# Patient Record
Sex: FEMALE | Race: WHITE | NOT HISPANIC OR LATINO | Employment: FULL TIME | URBAN - METROPOLITAN AREA
[De-identification: names, ages, dates, MRNs, and addresses within clinical notes are randomized per-mention and may not be internally consistent; named-entity substitution may affect disease eponyms.]

---

## 2017-01-26 ENCOUNTER — ALLSCRIPTS OFFICE VISIT (OUTPATIENT)
Dept: OTHER | Facility: OTHER | Age: 32
End: 2017-01-26

## 2017-01-26 ENCOUNTER — LAB REQUISITION (OUTPATIENT)
Dept: LAB | Facility: HOSPITAL | Age: 32
End: 2017-01-26
Payer: COMMERCIAL

## 2017-01-26 DIAGNOSIS — Z01.411 ENCOUNTER FOR GYNECOLOGICAL EXAMINATION WITH ABNORMAL FINDING: ICD-10-CM

## 2017-01-26 DIAGNOSIS — N89.8 OTHER SPECIFIED NONINFLAMMATORY DISORDERS OF VAGINA: ICD-10-CM

## 2017-01-26 DIAGNOSIS — M79.673 PAIN OF FOOT: ICD-10-CM

## 2017-01-26 DIAGNOSIS — Z20.2 CONTACT WITH AND (SUSPECTED) EXPOSURE TO INFECTIONS WITH A PREDOMINANTLY SEXUAL MODE OF TRANSMISSION: ICD-10-CM

## 2017-01-26 DIAGNOSIS — M25.511 PAIN IN RIGHT SHOULDER: ICD-10-CM

## 2017-01-26 PROCEDURE — 87389 HIV-1 AG W/HIV-1&-2 AB AG IA: CPT | Performed by: FAMILY MEDICINE

## 2017-01-26 PROCEDURE — 87491 CHLMYD TRACH DNA AMP PROBE: CPT | Performed by: FAMILY MEDICINE

## 2017-01-26 PROCEDURE — 87660 TRICHOMONAS VAGIN DIR PROBE: CPT | Performed by: FAMILY MEDICINE

## 2017-01-26 PROCEDURE — 87480 CANDIDA DNA DIR PROBE: CPT | Performed by: FAMILY MEDICINE

## 2017-01-26 PROCEDURE — 80074 ACUTE HEPATITIS PANEL: CPT | Performed by: FAMILY MEDICINE

## 2017-01-26 PROCEDURE — 86592 SYPHILIS TEST NON-TREP QUAL: CPT | Performed by: FAMILY MEDICINE

## 2017-01-26 PROCEDURE — 87591 N.GONORRHOEAE DNA AMP PROB: CPT | Performed by: FAMILY MEDICINE

## 2017-01-26 PROCEDURE — 88175 CYTOPATH C/V AUTO FLUID REDO: CPT | Performed by: FAMILY MEDICINE

## 2017-01-26 PROCEDURE — 87510 GARDNER VAG DNA DIR PROBE: CPT | Performed by: FAMILY MEDICINE

## 2017-01-27 LAB
CHLAMYDIA DNA CVX QL NAA+PROBE: NORMAL
HAV IGM SER QL: NORMAL
HBV CORE IGM SER QL: NORMAL
HBV SURFACE AG SER QL: NORMAL
HCV AB SER QL: NORMAL
HIV 1+2 AB+HIV1 P24 AG SERPL QL IA: NORMAL
N GONORRHOEA DNA GENITAL QL NAA+PROBE: NORMAL
RPR SER QL: NORMAL

## 2017-01-28 LAB
CANDIDA RRNA VAG QL PROBE: NEGATIVE
G VAGINALIS RRNA GENITAL QL PROBE: POSITIVE
T VAGINALIS RRNA GENITAL QL PROBE: NEGATIVE

## 2017-02-03 LAB
LAB AP GYN PRIMARY INTERPRETATION: NORMAL
LAB AP LMP: NORMAL
Lab: NORMAL
PATH INTERP SPEC-IMP: NORMAL

## 2017-02-18 ENCOUNTER — GENERIC CONVERSION - ENCOUNTER (OUTPATIENT)
Dept: OTHER | Facility: OTHER | Age: 32
End: 2017-02-18

## 2017-02-24 ENCOUNTER — ALLSCRIPTS OFFICE VISIT (OUTPATIENT)
Dept: OTHER | Facility: OTHER | Age: 32
End: 2017-02-24

## 2017-02-24 LAB — HCG, QUALITATIVE (HISTORICAL): POSITIVE

## 2017-03-02 ENCOUNTER — ALLSCRIPTS OFFICE VISIT (OUTPATIENT)
Dept: OTHER | Facility: OTHER | Age: 32
End: 2017-03-02

## 2017-03-02 ENCOUNTER — LAB REQUISITION (OUTPATIENT)
Dept: LAB | Facility: HOSPITAL | Age: 32
End: 2017-03-02
Payer: COMMERCIAL

## 2017-03-02 DIAGNOSIS — Z32.01 ENCOUNTER FOR PREGNANCY TEST, RESULT POSITIVE: ICD-10-CM

## 2017-03-02 LAB
B-HCG SERPL-ACNC: ABNORMAL MIU/ML
PROGEST SERPL-MCNC: 17 NG/ML

## 2017-03-02 PROCEDURE — 86850 RBC ANTIBODY SCREEN: CPT | Performed by: OBSTETRICS & GYNECOLOGY

## 2017-03-02 PROCEDURE — 84702 CHORIONIC GONADOTROPIN TEST: CPT | Performed by: OBSTETRICS & GYNECOLOGY

## 2017-03-02 PROCEDURE — 84144 ASSAY OF PROGESTERONE: CPT | Performed by: OBSTETRICS & GYNECOLOGY

## 2017-03-02 PROCEDURE — 86901 BLOOD TYPING SEROLOGIC RH(D): CPT | Performed by: OBSTETRICS & GYNECOLOGY

## 2017-03-02 PROCEDURE — 86900 BLOOD TYPING SEROLOGIC ABO: CPT | Performed by: OBSTETRICS & GYNECOLOGY

## 2017-03-03 ENCOUNTER — GENERIC CONVERSION - ENCOUNTER (OUTPATIENT)
Dept: OTHER | Facility: OTHER | Age: 32
End: 2017-03-03

## 2017-03-03 DIAGNOSIS — M25.511 PAIN IN RIGHT SHOULDER: ICD-10-CM

## 2017-03-03 DIAGNOSIS — Z36.9 ENCOUNTER FOR ANTENATAL SCREENING OF MOTHER: ICD-10-CM

## 2017-03-03 LAB
ABO GROUP BLD: NORMAL
BLD GP AB SCN SERPL QL: NEGATIVE
RH BLD: POSITIVE

## 2017-03-08 ENCOUNTER — TRANSCRIBE ORDERS (OUTPATIENT)
Dept: ADMINISTRATIVE | Facility: HOSPITAL | Age: 32
End: 2017-03-08

## 2017-03-08 ENCOUNTER — HOSPITAL ENCOUNTER (OUTPATIENT)
Dept: RADIOLOGY | Facility: MEDICAL CENTER | Age: 32
Discharge: HOME | End: 2017-03-08
Payer: COMMERCIAL

## 2017-03-08 DIAGNOSIS — M79.673 PAIN OF FOOT: ICD-10-CM

## 2017-03-08 DIAGNOSIS — M25.511 PAIN IN RIGHT SHOULDER: ICD-10-CM

## 2017-03-08 PROCEDURE — 73630 X-RAY EXAM OF FOOT: CPT

## 2017-03-08 PROCEDURE — 73030 X-RAY EXAM OF SHOULDER: CPT

## 2017-03-09 ENCOUNTER — HOSPITAL ENCOUNTER (OUTPATIENT)
Dept: ULTRASOUND IMAGING | Facility: MEDICAL CENTER | Age: 32
Discharge: HOME | End: 2017-03-09
Payer: COMMERCIAL

## 2017-03-09 DIAGNOSIS — Z36.9 ENCOUNTER FOR ANTENATAL SCREENING OF MOTHER: ICD-10-CM

## 2017-03-09 PROCEDURE — 76801 OB US < 14 WKS SINGLE FETUS: CPT

## 2017-03-13 ENCOUNTER — GENERIC CONVERSION - ENCOUNTER (OUTPATIENT)
Dept: OTHER | Facility: OTHER | Age: 32
End: 2017-03-13

## 2017-03-16 ENCOUNTER — ALLSCRIPTS OFFICE VISIT (OUTPATIENT)
Dept: OTHER | Facility: OTHER | Age: 32
End: 2017-03-16

## 2017-03-16 ENCOUNTER — LAB REQUISITION (OUTPATIENT)
Dept: LAB | Facility: HOSPITAL | Age: 32
End: 2017-03-16
Payer: COMMERCIAL

## 2017-03-16 DIAGNOSIS — Z34.81 ENCOUNTER FOR SUPERVISION OF OTHER NORMAL PREGNANCY, FIRST TRIMESTER: ICD-10-CM

## 2017-03-16 PROCEDURE — 87086 URINE CULTURE/COLONY COUNT: CPT | Performed by: NURSE PRACTITIONER

## 2017-03-17 LAB — BACTERIA UR CULT: NORMAL

## 2017-03-24 ENCOUNTER — GENERIC CONVERSION - ENCOUNTER (OUTPATIENT)
Dept: OTHER | Facility: OTHER | Age: 32
End: 2017-03-24

## 2017-03-27 ENCOUNTER — ALLSCRIPTS OFFICE VISIT (OUTPATIENT)
Dept: OTHER | Facility: OTHER | Age: 32
End: 2017-03-27

## 2017-03-28 ENCOUNTER — LAB REQUISITION (OUTPATIENT)
Dept: LAB | Facility: HOSPITAL | Age: 32
End: 2017-03-28
Payer: COMMERCIAL

## 2017-03-28 DIAGNOSIS — Z34.81 ENCOUNTER FOR SUPERVISION OF OTHER NORMAL PREGNANCY, FIRST TRIMESTER: ICD-10-CM

## 2017-03-28 LAB
ABO GROUP BLD: NORMAL
BASOPHILS # BLD AUTO: 0.01 THOUSANDS/ΜL (ref 0–0.1)
BASOPHILS NFR BLD AUTO: 0 % (ref 0–1)
BILIRUB UR QL STRIP: NEGATIVE
BLD GP AB SCN SERPL QL: NEGATIVE
CLARITY UR: NORMAL
COLOR UR: YELLOW
EOSINOPHIL # BLD AUTO: 0.1 THOUSAND/ΜL (ref 0–0.61)
EOSINOPHIL NFR BLD AUTO: 1 % (ref 0–6)
ERYTHROCYTE [DISTWIDTH] IN BLOOD BY AUTOMATED COUNT: 15.1 % (ref 11.6–15.1)
GLUCOSE UR STRIP-MCNC: NEGATIVE MG/DL
HBV SURFACE AG SER QL: NORMAL
HCT VFR BLD AUTO: 39.7 % (ref 34.8–46.1)
HGB BLD-MCNC: 12.5 G/DL (ref 11.5–15.4)
HGB UR QL STRIP.AUTO: NEGATIVE
KETONES UR STRIP-MCNC: NEGATIVE MG/DL
LEUKOCYTE ESTERASE UR QL STRIP: NEGATIVE
LYMPHOCYTES # BLD AUTO: 1.62 THOUSANDS/ΜL (ref 0.6–4.47)
LYMPHOCYTES NFR BLD AUTO: 22 % (ref 14–44)
MCH RBC QN AUTO: 30.7 PG (ref 26.8–34.3)
MCHC RBC AUTO-ENTMCNC: 31.5 G/DL (ref 31.4–37.4)
MCV RBC AUTO: 98 FL (ref 82–98)
MONOCYTES # BLD AUTO: 0.93 THOUSAND/ΜL (ref 0.17–1.22)
MONOCYTES NFR BLD AUTO: 12 % (ref 4–12)
NEUTROPHILS # BLD AUTO: 4.83 THOUSANDS/ΜL (ref 1.85–7.62)
NEUTS SEG NFR BLD AUTO: 65 % (ref 43–75)
NITRITE UR QL STRIP: NEGATIVE
NRBC BLD AUTO-RTO: 0 /100 WBCS
PH UR STRIP.AUTO: 7 [PH] (ref 4.5–8)
PLATELET # BLD AUTO: 282 THOUSANDS/UL (ref 149–390)
PMV BLD AUTO: 10.8 FL (ref 8.9–12.7)
PROT UR STRIP-MCNC: NEGATIVE MG/DL
RBC # BLD AUTO: 4.07 MILLION/UL (ref 3.81–5.12)
RH BLD: POSITIVE
RUBV IGG SERPL IA-ACNC: 86.2 IU/ML
SP GR UR STRIP.AUTO: 1.01 (ref 1–1.03)
UROBILINOGEN UR QL STRIP.AUTO: 0.2 E.U./DL
WBC # BLD AUTO: 7.5 THOUSAND/UL (ref 4.31–10.16)

## 2017-03-28 PROCEDURE — 87086 URINE CULTURE/COLONY COUNT: CPT | Performed by: NURSE PRACTITIONER

## 2017-03-28 PROCEDURE — 80081 OBSTETRIC PANEL INC HIV TSTG: CPT | Performed by: NURSE PRACTITIONER

## 2017-03-28 PROCEDURE — 81003 URINALYSIS AUTO W/O SCOPE: CPT | Performed by: NURSE PRACTITIONER

## 2017-03-29 LAB
BACTERIA UR CULT: NORMAL
HIV 1+2 AB+HIV1 P24 AG SERPL QL IA: NORMAL
RPR SER QL: NORMAL

## 2017-04-06 ENCOUNTER — GENERIC CONVERSION - ENCOUNTER (OUTPATIENT)
Dept: OTHER | Facility: OTHER | Age: 32
End: 2017-04-06

## 2017-04-06 ENCOUNTER — ALLSCRIPTS OFFICE VISIT (OUTPATIENT)
Dept: PERINATAL CARE | Facility: CLINIC | Age: 32
End: 2017-04-06
Payer: COMMERCIAL

## 2017-04-06 PROCEDURE — 76813 OB US NUCHAL MEAS 1 GEST: CPT | Performed by: OBSTETRICS & GYNECOLOGY

## 2017-04-06 PROCEDURE — 76801 OB US < 14 WKS SINGLE FETUS: CPT | Performed by: OBSTETRICS & GYNECOLOGY

## 2017-04-13 ENCOUNTER — ALLSCRIPTS OFFICE VISIT (OUTPATIENT)
Dept: OTHER | Facility: OTHER | Age: 32
End: 2017-04-13

## 2017-04-21 ENCOUNTER — GENERIC CONVERSION - ENCOUNTER (OUTPATIENT)
Dept: OTHER | Facility: OTHER | Age: 32
End: 2017-04-21

## 2017-04-27 ENCOUNTER — GENERIC CONVERSION - ENCOUNTER (OUTPATIENT)
Dept: OTHER | Facility: OTHER | Age: 32
End: 2017-04-27

## 2017-04-27 ENCOUNTER — ALLSCRIPTS OFFICE VISIT (OUTPATIENT)
Dept: PERINATAL CARE | Facility: CLINIC | Age: 32
End: 2017-04-27
Payer: COMMERCIAL

## 2017-04-27 PROCEDURE — 76817 TRANSVAGINAL US OBSTETRIC: CPT | Performed by: OBSTETRICS & GYNECOLOGY

## 2017-04-27 PROCEDURE — 76815 OB US LIMITED FETUS(S): CPT | Performed by: OBSTETRICS & GYNECOLOGY

## 2017-05-11 ENCOUNTER — ALLSCRIPTS OFFICE VISIT (OUTPATIENT)
Dept: PERINATAL CARE | Facility: CLINIC | Age: 32
End: 2017-05-11
Payer: COMMERCIAL

## 2017-05-11 ENCOUNTER — GENERIC CONVERSION - ENCOUNTER (OUTPATIENT)
Dept: OTHER | Facility: OTHER | Age: 32
End: 2017-05-11

## 2017-05-11 PROCEDURE — 76817 TRANSVAGINAL US OBSTETRIC: CPT | Performed by: OBSTETRICS & GYNECOLOGY

## 2017-05-12 ENCOUNTER — GENERIC CONVERSION - ENCOUNTER (OUTPATIENT)
Dept: OTHER | Facility: OTHER | Age: 32
End: 2017-05-12

## 2017-05-25 ENCOUNTER — ALLSCRIPTS OFFICE VISIT (OUTPATIENT)
Dept: PERINATAL CARE | Facility: CLINIC | Age: 32
End: 2017-05-25
Payer: COMMERCIAL

## 2017-05-25 ENCOUNTER — GENERIC CONVERSION - ENCOUNTER (OUTPATIENT)
Dept: OTHER | Facility: OTHER | Age: 32
End: 2017-05-25

## 2017-05-25 PROCEDURE — 76811 OB US DETAILED SNGL FETUS: CPT | Performed by: OBSTETRICS & GYNECOLOGY

## 2017-05-25 PROCEDURE — 76817 TRANSVAGINAL US OBSTETRIC: CPT | Performed by: OBSTETRICS & GYNECOLOGY

## 2017-06-08 ENCOUNTER — ALLSCRIPTS OFFICE VISIT (OUTPATIENT)
Dept: PERINATAL CARE | Facility: CLINIC | Age: 32
End: 2017-06-08
Payer: COMMERCIAL

## 2017-06-08 ENCOUNTER — GENERIC CONVERSION - ENCOUNTER (OUTPATIENT)
Dept: OTHER | Facility: OTHER | Age: 32
End: 2017-06-08

## 2017-06-08 PROCEDURE — 76816 OB US FOLLOW-UP PER FETUS: CPT | Performed by: OBSTETRICS & GYNECOLOGY

## 2017-06-08 PROCEDURE — 76817 TRANSVAGINAL US OBSTETRIC: CPT | Performed by: OBSTETRICS & GYNECOLOGY

## 2017-06-12 ENCOUNTER — GENERIC CONVERSION - ENCOUNTER (OUTPATIENT)
Dept: OTHER | Facility: OTHER | Age: 32
End: 2017-06-12

## 2017-06-16 ENCOUNTER — ALLSCRIPTS OFFICE VISIT (OUTPATIENT)
Dept: OTHER | Facility: OTHER | Age: 32
End: 2017-06-16

## 2017-06-22 ENCOUNTER — ALLSCRIPTS OFFICE VISIT (OUTPATIENT)
Dept: PERINATAL CARE | Facility: CLINIC | Age: 32
End: 2017-06-22
Payer: COMMERCIAL

## 2017-06-22 ENCOUNTER — GENERIC CONVERSION - ENCOUNTER (OUTPATIENT)
Dept: OTHER | Facility: OTHER | Age: 32
End: 2017-06-22

## 2017-06-22 PROCEDURE — 59025 FETAL NON-STRESS TEST: CPT | Performed by: OBSTETRICS & GYNECOLOGY

## 2017-06-22 PROCEDURE — 76817 TRANSVAGINAL US OBSTETRIC: CPT | Performed by: OBSTETRICS & GYNECOLOGY

## 2017-07-12 ENCOUNTER — ALLSCRIPTS OFFICE VISIT (OUTPATIENT)
Dept: OTHER | Facility: OTHER | Age: 32
End: 2017-07-12

## 2017-07-20 ENCOUNTER — ALLSCRIPTS OFFICE VISIT (OUTPATIENT)
Dept: PERINATAL CARE | Facility: CLINIC | Age: 32
End: 2017-07-20
Payer: COMMERCIAL

## 2017-07-20 ENCOUNTER — GENERIC CONVERSION - ENCOUNTER (OUTPATIENT)
Dept: OTHER | Facility: OTHER | Age: 32
End: 2017-07-20

## 2017-07-20 PROCEDURE — 76817 TRANSVAGINAL US OBSTETRIC: CPT | Performed by: OBSTETRICS & GYNECOLOGY

## 2017-07-20 PROCEDURE — 76816 OB US FOLLOW-UP PER FETUS: CPT | Performed by: OBSTETRICS & GYNECOLOGY

## 2017-08-10 ENCOUNTER — GENERIC CONVERSION - ENCOUNTER (OUTPATIENT)
Dept: OTHER | Facility: OTHER | Age: 32
End: 2017-08-10

## 2017-08-10 DIAGNOSIS — R73.02 IMPAIRED GLUCOSE TOLERANCE: ICD-10-CM

## 2017-08-10 DIAGNOSIS — R20.2 PARESTHESIA OF SKIN: ICD-10-CM

## 2017-08-10 DIAGNOSIS — R20.0 ANESTHESIA OF SKIN: ICD-10-CM

## 2017-08-11 ENCOUNTER — HOSPITAL ENCOUNTER (OUTPATIENT)
Facility: HOSPITAL | Age: 32
Setting detail: OBSERVATION
Discharge: HOME | End: 2017-08-12
Attending: EMERGENCY MEDICINE | Admitting: OBSTETRICS & GYNECOLOGY
Payer: COMMERCIAL

## 2017-08-11 ENCOUNTER — HOSPITAL ENCOUNTER (OUTPATIENT)
Dept: NON INVASIVE DIAGNOSTICS | Facility: CLINIC | Age: 32
Discharge: HOME | End: 2017-08-11
Payer: COMMERCIAL

## 2017-08-11 DIAGNOSIS — I82.409 ACUTE DEEP VEIN THROMBOSIS (DVT) (HCC): Primary | ICD-10-CM

## 2017-08-11 DIAGNOSIS — R20.0 ANESTHESIA OF SKIN: ICD-10-CM

## 2017-08-11 DIAGNOSIS — R20.2 PARESTHESIA OF SKIN: ICD-10-CM

## 2017-08-11 LAB
ATRIAL RATE: 81 BPM
P AXIS: 37 DEGREES
PR INTERVAL: 120 MS
QRS AXIS: 75 DEGREES
QRSD INTERVAL: 92 MS
QT INTERVAL: 354 MS
QTC INTERVAL: 411 MS
T WAVE AXIS: -5 DEGREES
VENTRICULAR RATE: 81 BPM

## 2017-08-11 PROCEDURE — 76817 TRANSVAGINAL US OBSTETRIC: CPT | Performed by: OBSTETRICS & GYNECOLOGY

## 2017-08-11 PROCEDURE — 93971 EXTREMITY STUDY: CPT

## 2017-08-11 PROCEDURE — 96372 THER/PROPH/DIAG INJ SC/IM: CPT

## 2017-08-11 PROCEDURE — 93005 ELECTROCARDIOGRAM TRACING: CPT

## 2017-08-11 PROCEDURE — 99284 EMERGENCY DEPT VISIT MOD MDM: CPT

## 2017-08-11 RX ORDER — ALBUTEROL SULFATE 90 UG/1
AEROSOL, METERED RESPIRATORY (INHALATION)
COMMUNITY
Start: 2017-04-13

## 2017-08-11 RX ORDER — PRENATAL NO.42/FOLIC ACID 1.4 MG
TABLET CHEW,IMMED AND DELAY REL,BIPHASE ORAL
COMMUNITY

## 2017-08-11 RX ORDER — FAMOTIDINE 20 MG/1
TABLET, FILM COATED ORAL
COMMUNITY
Start: 2017-06-12

## 2017-08-11 RX ORDER — CALCIUM CARBONATE 200(500)MG
1000 TABLET,CHEWABLE ORAL 2 TIMES DAILY PRN
Status: DISCONTINUED | OUTPATIENT
Start: 2017-08-11 | End: 2017-08-12 | Stop reason: HOSPADM

## 2017-08-11 RX ORDER — ACETAMINOPHEN 325 MG/1
650 TABLET ORAL EVERY 4 HOURS PRN
Status: DISCONTINUED | OUTPATIENT
Start: 2017-08-11 | End: 2017-08-12 | Stop reason: HOSPADM

## 2017-08-11 RX ADMIN — ENOXAPARIN SODIUM 120 MG: 120 INJECTION SUBCUTANEOUS at 18:59

## 2017-08-11 RX ADMIN — Medication 1000 MG: at 20:07

## 2017-08-12 VITALS
BODY MASS INDEX: 32.66 KG/M2 | DIASTOLIC BLOOD PRESSURE: 57 MMHG | HEIGHT: 61 IN | RESPIRATION RATE: 18 BRPM | HEART RATE: 83 BPM | OXYGEN SATURATION: 97 % | WEIGHT: 173 LBS | TEMPERATURE: 97.4 F | SYSTOLIC BLOOD PRESSURE: 104 MMHG

## 2017-08-12 PROBLEM — I82.409 ACUTE DEEP VEIN THROMBOSIS (DVT) (HCC): Status: ACTIVE | Noted: 2017-08-12

## 2017-08-12 PROBLEM — Z34.90 PREGNANT: Status: ACTIVE | Noted: 2017-08-12

## 2017-08-12 PROBLEM — Z87.51 HISTORY OF PRETERM DELIVERY: Status: ACTIVE | Noted: 2017-08-12

## 2017-08-12 PROBLEM — Z3A.31 31 WEEKS GESTATION OF PREGNANCY: Status: ACTIVE | Noted: 2017-08-12

## 2017-08-12 PROBLEM — R07.9 CHEST PAIN: Status: ACTIVE | Noted: 2017-08-12

## 2017-08-12 PROCEDURE — G0463 HOSPITAL OUTPT CLINIC VISIT: HCPCS

## 2017-08-12 PROCEDURE — 99204 OFFICE O/P NEW MOD 45 MIN: CPT

## 2017-08-12 RX ADMIN — ENOXAPARIN SODIUM 80 MG: 80 INJECTION SUBCUTANEOUS at 09:14

## 2017-08-17 ENCOUNTER — LAB REQUISITION (OUTPATIENT)
Dept: LAB | Facility: HOSPITAL | Age: 32
End: 2017-08-17
Payer: COMMERCIAL

## 2017-08-17 ENCOUNTER — GENERIC CONVERSION - ENCOUNTER (OUTPATIENT)
Dept: OTHER | Facility: OTHER | Age: 32
End: 2017-08-17

## 2017-08-17 DIAGNOSIS — Z34.93 ENCOUNTER FOR SUPERVISION OF NORMAL PREGNANCY IN THIRD TRIMESTER: ICD-10-CM

## 2017-08-17 LAB
BASOPHILS # BLD AUTO: 0.01 THOUSANDS/ΜL (ref 0–0.1)
BASOPHILS NFR BLD AUTO: 0 % (ref 0–1)
EOSINOPHIL # BLD AUTO: 0.07 THOUSAND/ΜL (ref 0–0.61)
EOSINOPHIL NFR BLD AUTO: 1 % (ref 0–6)
ERYTHROCYTE [DISTWIDTH] IN BLOOD BY AUTOMATED COUNT: 13.7 % (ref 11.6–15.1)
GLUCOSE 1H P 50 G GLC PO SERPL-MCNC: 157 MG/DL
HCT VFR BLD AUTO: 32.6 % (ref 34.8–46.1)
HGB BLD-MCNC: 10.9 G/DL (ref 11.5–15.4)
LYMPHOCYTES # BLD AUTO: 1.38 THOUSANDS/ΜL (ref 0.6–4.47)
LYMPHOCYTES NFR BLD AUTO: 19 % (ref 14–44)
MCH RBC QN AUTO: 29.2 PG (ref 26.8–34.3)
MCHC RBC AUTO-ENTMCNC: 33.4 G/DL (ref 31.4–37.4)
MCV RBC AUTO: 87 FL (ref 82–98)
MONOCYTES # BLD AUTO: 0.82 THOUSAND/ΜL (ref 0.17–1.22)
MONOCYTES NFR BLD AUTO: 11 % (ref 4–12)
NEUTROPHILS # BLD AUTO: 4.86 THOUSANDS/ΜL (ref 1.85–7.62)
NEUTS SEG NFR BLD AUTO: 69 % (ref 43–75)
NRBC BLD AUTO-RTO: 0 /100 WBCS
PLATELET # BLD AUTO: 321 THOUSANDS/UL (ref 149–390)
PMV BLD AUTO: 10.4 FL (ref 8.9–12.7)
RBC # BLD AUTO: 3.73 MILLION/UL (ref 3.81–5.12)
WBC # BLD AUTO: 7.17 THOUSAND/UL (ref 4.31–10.16)

## 2017-08-17 PROCEDURE — 82950 GLUCOSE TEST: CPT | Performed by: OBSTETRICS & GYNECOLOGY

## 2017-08-17 PROCEDURE — 85025 COMPLETE CBC W/AUTO DIFF WBC: CPT | Performed by: OBSTETRICS & GYNECOLOGY

## 2017-08-18 ENCOUNTER — GENERIC CONVERSION - ENCOUNTER (OUTPATIENT)
Dept: OTHER | Facility: OTHER | Age: 32
End: 2017-08-18

## 2017-08-23 ENCOUNTER — APPOINTMENT (OUTPATIENT)
Dept: LAB | Facility: HOSPITAL | Age: 32
End: 2017-08-23
Attending: OBSTETRICS & GYNECOLOGY
Payer: COMMERCIAL

## 2017-08-23 DIAGNOSIS — R73.02 IMPAIRED GLUCOSE TOLERANCE: ICD-10-CM

## 2017-08-23 LAB — GLUCOSE P FAST SERPL-MCNC: 104 MG/DL (ref 65–99)

## 2017-08-23 PROCEDURE — 36415 COLL VENOUS BLD VENIPUNCTURE: CPT

## 2017-08-23 PROCEDURE — 82951 GLUCOSE TOLERANCE TEST (GTT): CPT

## 2017-08-28 ENCOUNTER — GENERIC CONVERSION - ENCOUNTER (OUTPATIENT)
Dept: OTHER | Facility: OTHER | Age: 32
End: 2017-08-28

## 2017-08-31 ENCOUNTER — GENERIC CONVERSION - ENCOUNTER (OUTPATIENT)
Dept: OTHER | Facility: OTHER | Age: 32
End: 2017-08-31

## 2017-09-01 ENCOUNTER — ALLSCRIPTS OFFICE VISIT (OUTPATIENT)
Dept: PERINATAL CARE | Facility: CLINIC | Age: 32
End: 2017-09-01
Payer: COMMERCIAL

## 2017-09-01 ENCOUNTER — GENERIC CONVERSION - ENCOUNTER (OUTPATIENT)
Dept: OTHER | Facility: OTHER | Age: 32
End: 2017-09-01

## 2017-09-01 PROCEDURE — 76816 OB US FOLLOW-UP PER FETUS: CPT | Performed by: OBSTETRICS & GYNECOLOGY

## 2017-09-05 ENCOUNTER — GENERIC CONVERSION - ENCOUNTER (OUTPATIENT)
Dept: OTHER | Facility: OTHER | Age: 32
End: 2017-09-05

## 2017-09-05 ENCOUNTER — APPOINTMENT (OUTPATIENT)
Dept: PERINATAL CARE | Facility: CLINIC | Age: 32
End: 2017-09-05
Payer: COMMERCIAL

## 2017-09-05 PROCEDURE — G0109 DIAB MANAGE TRN IND/GROUP: HCPCS | Performed by: OBSTETRICS & GYNECOLOGY

## 2017-09-11 ENCOUNTER — APPOINTMENT (OUTPATIENT)
Dept: PERINATAL CARE | Facility: CLINIC | Age: 32
End: 2017-09-11
Payer: COMMERCIAL

## 2017-09-11 PROCEDURE — G0108 DIAB MANAGE TRN  PER INDIV: HCPCS | Performed by: OBSTETRICS & GYNECOLOGY

## 2017-09-13 ENCOUNTER — LAB REQUISITION (OUTPATIENT)
Dept: LAB | Facility: HOSPITAL | Age: 32
End: 2017-09-13
Payer: COMMERCIAL

## 2017-09-13 ENCOUNTER — GENERIC CONVERSION - ENCOUNTER (OUTPATIENT)
Dept: OTHER | Facility: OTHER | Age: 32
End: 2017-09-13

## 2017-09-13 DIAGNOSIS — Z34.93 ENCOUNTER FOR SUPERVISION OF NORMAL PREGNANCY IN THIRD TRIMESTER: ICD-10-CM

## 2017-09-13 PROCEDURE — 87653 STREP B DNA AMP PROBE: CPT | Performed by: NURSE PRACTITIONER

## 2017-09-14 LAB — GP B STREP DNA SPEC QL NAA+PROBE: ABNORMAL

## 2017-09-20 ENCOUNTER — GENERIC CONVERSION - ENCOUNTER (OUTPATIENT)
Dept: OTHER | Facility: OTHER | Age: 32
End: 2017-09-20

## 2017-09-26 ENCOUNTER — ALLSCRIPTS OFFICE VISIT (OUTPATIENT)
Dept: OTHER | Facility: OTHER | Age: 32
End: 2017-09-26

## 2017-09-26 ENCOUNTER — GENERIC CONVERSION - ENCOUNTER (OUTPATIENT)
Dept: OTHER | Facility: OTHER | Age: 32
End: 2017-09-26

## 2017-09-29 ENCOUNTER — GENERIC CONVERSION - ENCOUNTER (OUTPATIENT)
Dept: OTHER | Facility: OTHER | Age: 32
End: 2017-09-29

## 2017-09-29 ENCOUNTER — ALLSCRIPTS OFFICE VISIT (OUTPATIENT)
Dept: PERINATAL CARE | Facility: CLINIC | Age: 32
End: 2017-09-29
Payer: COMMERCIAL

## 2017-09-29 PROCEDURE — 76816 OB US FOLLOW-UP PER FETUS: CPT | Performed by: OBSTETRICS & GYNECOLOGY

## 2017-10-05 ENCOUNTER — HOSPITAL ENCOUNTER (INPATIENT)
Facility: HOSPITAL | Age: 32
LOS: 3 days | Discharge: HOME | End: 2017-10-08
Attending: OBSTETRICS & GYNECOLOGY | Admitting: OBSTETRICS & GYNECOLOGY
Payer: COMMERCIAL

## 2017-10-05 ENCOUNTER — HOSPITAL ENCOUNTER (OUTPATIENT)
Dept: LABOR AND DELIVERY | Facility: HOSPITAL | Age: 32
Discharge: HOME | End: 2017-10-05
Payer: COMMERCIAL

## 2017-10-05 DIAGNOSIS — Z3A.39 39 WEEKS GESTATION OF PREGNANCY: ICD-10-CM

## 2017-10-05 PROBLEM — O24.419 GESTATIONAL DIABETES MELLITUS (GDM) AFFECTING PREGNANCY, ANTEPARTUM: Status: ACTIVE | Noted: 2017-10-05

## 2017-10-05 PROBLEM — O22.30 DVT (DEEP VEIN THROMBOSIS) IN PREGNANCY: Status: ACTIVE | Noted: 2017-10-05

## 2017-10-05 PROBLEM — K59.09 CHRONIC CONSTIPATION: Status: ACTIVE | Noted: 2017-10-05

## 2017-10-05 PROBLEM — F43.10 PTSD (POST-TRAUMATIC STRESS DISORDER): Status: ACTIVE | Noted: 2017-10-05

## 2017-10-05 LAB
ABO GROUP BLD: NORMAL
BLD GP AB SCN SERPL QL: NEGATIVE
ERYTHROCYTE [DISTWIDTH] IN BLOOD BY AUTOMATED COUNT: 14.3 % (ref 11.6–15.1)
GLUCOSE SERPL-MCNC: 86 MG/DL (ref 65–140)
HCT VFR BLD AUTO: 33.6 % (ref 34.8–46.1)
HGB BLD-MCNC: 11.1 G/DL (ref 11.5–15.4)
MCH RBC QN AUTO: 28 PG (ref 26.8–34.3)
MCHC RBC AUTO-ENTMCNC: 33 G/DL (ref 31.4–37.4)
MCV RBC AUTO: 85 FL (ref 82–98)
PLATELET # BLD AUTO: 400 THOUSANDS/UL (ref 149–390)
PMV BLD AUTO: 10.9 FL (ref 8.9–12.7)
RBC # BLD AUTO: 3.97 MILLION/UL (ref 3.81–5.12)
RH BLD: POSITIVE
SPECIMEN EXPIRATION DATE: NORMAL
WBC # BLD AUTO: 10.78 THOUSAND/UL (ref 4.31–10.16)

## 2017-10-05 PROCEDURE — 85027 COMPLETE CBC AUTOMATED: CPT | Performed by: OBSTETRICS & GYNECOLOGY

## 2017-10-05 PROCEDURE — 86901 BLOOD TYPING SEROLOGIC RH(D): CPT | Performed by: OBSTETRICS & GYNECOLOGY

## 2017-10-05 PROCEDURE — 86900 BLOOD TYPING SEROLOGIC ABO: CPT | Performed by: OBSTETRICS & GYNECOLOGY

## 2017-10-05 PROCEDURE — 86592 SYPHILIS TEST NON-TREP QUAL: CPT | Performed by: OBSTETRICS & GYNECOLOGY

## 2017-10-05 PROCEDURE — 86850 RBC ANTIBODY SCREEN: CPT | Performed by: OBSTETRICS & GYNECOLOGY

## 2017-10-05 PROCEDURE — 82948 REAGENT STRIP/BLOOD GLUCOSE: CPT

## 2017-10-05 RX ORDER — BUTORPHANOL TARTRATE 1 MG/ML
1 INJECTION, SOLUTION INTRAMUSCULAR; INTRAVENOUS ONCE AS NEEDED
Status: DISCONTINUED | OUTPATIENT
Start: 2017-10-05 | End: 2017-10-06

## 2017-10-05 RX ORDER — ALBUTEROL SULFATE 90 UG/1
2 AEROSOL, METERED RESPIRATORY (INHALATION) EVERY 4 HOURS PRN
Status: DISCONTINUED | OUTPATIENT
Start: 2017-10-05 | End: 2017-10-06

## 2017-10-05 RX ORDER — OXYTOCIN/RINGER'S LACTATE 30/500 ML
1-30 PLASTIC BAG, INJECTION (ML) INTRAVENOUS
Status: DISCONTINUED | OUTPATIENT
Start: 2017-10-05 | End: 2017-10-06

## 2017-10-05 RX ORDER — ONDANSETRON 2 MG/ML
4 INJECTION INTRAMUSCULAR; INTRAVENOUS EVERY 4 HOURS PRN
Status: DISCONTINUED | OUTPATIENT
Start: 2017-10-05 | End: 2017-10-06

## 2017-10-05 RX ORDER — SODIUM CHLORIDE, SODIUM LACTATE, POTASSIUM CHLORIDE, CALCIUM CHLORIDE 600; 310; 30; 20 MG/100ML; MG/100ML; MG/100ML; MG/100ML
125 INJECTION, SOLUTION INTRAVENOUS CONTINUOUS
Status: DISCONTINUED | OUTPATIENT
Start: 2017-10-05 | End: 2017-10-06

## 2017-10-05 RX ADMIN — ONDANSETRON 4 MG: 2 INJECTION INTRAMUSCULAR; INTRAVENOUS at 23:41

## 2017-10-05 RX ADMIN — SODIUM CHLORIDE, SODIUM LACTATE, POTASSIUM CHLORIDE, AND CALCIUM CHLORIDE 125 ML/HR: .6; .31; .03; .02 INJECTION, SOLUTION INTRAVENOUS at 22:00

## 2017-10-05 RX ADMIN — Medication 2 MILLI-UNITS/MIN: at 22:04

## 2017-10-05 RX ADMIN — SODIUM CHLORIDE 5 MILLION UNITS: 0.9 INJECTION, SOLUTION INTRAVENOUS at 22:23

## 2017-10-06 ENCOUNTER — ANESTHESIA EVENT (INPATIENT)
Dept: LABOR AND DELIVERY | Facility: HOSPITAL | Age: 32
End: 2017-10-06
Payer: COMMERCIAL

## 2017-10-06 ENCOUNTER — ANESTHESIA (INPATIENT)
Dept: LABOR AND DELIVERY | Facility: HOSPITAL | Age: 32
End: 2017-10-06
Payer: COMMERCIAL

## 2017-10-06 LAB
BASE EXCESS BLDCOV CALC-SCNC: -1.8 MMOL/L (ref 1–9)
GLUCOSE SERPL-MCNC: 74 MG/DL (ref 65–140)
GLUCOSE SERPL-MCNC: 83 MG/DL (ref 65–140)
GLUCOSE SERPL-MCNC: 84 MG/DL (ref 65–140)
HCO3 BLDCOV-SCNC: 21.4 MMOL/L (ref 12.2–28.6)
OXYHGB MFR BLDCOV: 82.8 %
PCO2 BLDCOV: 32.8 MM HG (ref 27–43)
PH BLDCOV: 7.43 [PH] (ref 7.19–7.49)
PO2 BLDCOV: 35.7 MM HG (ref 15–45)
RPR SER QL: NORMAL
SAO2 % BLDCOV: 19.7 ML/DL

## 2017-10-06 PROCEDURE — 3E0P3VZ INTRODUCTION OF HORMONE INTO FEMALE REPRODUCTIVE, PERCUTANEOUS APPROACH: ICD-10-PCS | Performed by: OBSTETRICS & GYNECOLOGY

## 2017-10-06 PROCEDURE — 82805 BLOOD GASES W/O2 SATURATION: CPT | Performed by: OBSTETRICS & GYNECOLOGY

## 2017-10-06 PROCEDURE — 82948 REAGENT STRIP/BLOOD GLUCOSE: CPT

## 2017-10-06 RX ORDER — OXYTOCIN/RINGER'S LACTATE 30/500 ML
250 PLASTIC BAG, INJECTION (ML) INTRAVENOUS CONTINUOUS
Status: ACTIVE | OUTPATIENT
Start: 2017-10-06 | End: 2017-10-06

## 2017-10-06 RX ORDER — ACETAMINOPHEN 325 MG/1
650 TABLET ORAL EVERY 4 HOURS PRN
Status: DISCONTINUED | OUTPATIENT
Start: 2017-10-06 | End: 2017-10-08 | Stop reason: HOSPADM

## 2017-10-06 RX ORDER — IBUPROFEN 600 MG/1
600 TABLET ORAL EVERY 6 HOURS PRN
Status: DISCONTINUED | OUTPATIENT
Start: 2017-10-06 | End: 2017-10-08 | Stop reason: HOSPADM

## 2017-10-06 RX ORDER — CALCIUM CARBONATE 200(500)MG
500 TABLET,CHEWABLE ORAL DAILY PRN
Status: DISCONTINUED | OUTPATIENT
Start: 2017-10-06 | End: 2017-10-08 | Stop reason: HOSPADM

## 2017-10-06 RX ORDER — DIPHENHYDRAMINE HCL 25 MG
25 TABLET ORAL EVERY 6 HOURS PRN
Status: DISCONTINUED | OUTPATIENT
Start: 2017-10-06 | End: 2017-10-08 | Stop reason: HOSPADM

## 2017-10-06 RX ORDER — DIAPER,BRIEF,INFANT-TODD,DISP
1 EACH MISCELLANEOUS AS NEEDED
Status: DISCONTINUED | OUTPATIENT
Start: 2017-10-06 | End: 2017-10-08 | Stop reason: HOSPADM

## 2017-10-06 RX ORDER — SIMETHICONE 80 MG
80 TABLET,CHEWABLE ORAL 4 TIMES DAILY PRN
Status: DISCONTINUED | OUTPATIENT
Start: 2017-10-06 | End: 2017-10-08 | Stop reason: HOSPADM

## 2017-10-06 RX ORDER — OXYCODONE HYDROCHLORIDE AND ACETAMINOPHEN 5; 325 MG/1; MG/1
2 TABLET ORAL EVERY 4 HOURS PRN
Status: DISCONTINUED | OUTPATIENT
Start: 2017-10-06 | End: 2017-10-08 | Stop reason: HOSPADM

## 2017-10-06 RX ORDER — ONDANSETRON 2 MG/ML
4 INJECTION INTRAMUSCULAR; INTRAVENOUS EVERY 8 HOURS PRN
Status: DISCONTINUED | OUTPATIENT
Start: 2017-10-06 | End: 2017-10-08 | Stop reason: HOSPADM

## 2017-10-06 RX ORDER — DIPHENHYDRAMINE HYDROCHLORIDE 50 MG/ML
25 INJECTION INTRAMUSCULAR; INTRAVENOUS EVERY 6 HOURS PRN
Status: DISCONTINUED | OUTPATIENT
Start: 2017-10-06 | End: 2017-10-06

## 2017-10-06 RX ORDER — ONDANSETRON 2 MG/ML
4 INJECTION INTRAMUSCULAR; INTRAVENOUS EVERY 4 HOURS PRN
Status: DISCONTINUED | OUTPATIENT
Start: 2017-10-06 | End: 2017-10-06

## 2017-10-06 RX ORDER — OXYCODONE HYDROCHLORIDE AND ACETAMINOPHEN 5; 325 MG/1; MG/1
1 TABLET ORAL EVERY 4 HOURS PRN
Status: DISCONTINUED | OUTPATIENT
Start: 2017-10-06 | End: 2017-10-08 | Stop reason: HOSPADM

## 2017-10-06 RX ORDER — DOCUSATE SODIUM 100 MG/1
100 CAPSULE, LIQUID FILLED ORAL 2 TIMES DAILY
Status: DISCONTINUED | OUTPATIENT
Start: 2017-10-06 | End: 2017-10-08 | Stop reason: HOSPADM

## 2017-10-06 RX ADMIN — OXYCODONE HYDROCHLORIDE AND ACETAMINOPHEN 1 TABLET: 5; 325 TABLET ORAL at 14:22

## 2017-10-06 RX ADMIN — IBUPROFEN 600 MG: 600 TABLET, FILM COATED ORAL at 11:07

## 2017-10-06 RX ADMIN — SODIUM CHLORIDE 2.5 MILLION UNITS: 9 INJECTION, SOLUTION INTRAVENOUS at 02:34

## 2017-10-06 RX ADMIN — ENOXAPARIN SODIUM 80 MG: 80 INJECTION SUBCUTANEOUS at 22:26

## 2017-10-06 RX ADMIN — IBUPROFEN 600 MG: 600 TABLET, FILM COATED ORAL at 17:35

## 2017-10-06 RX ADMIN — OXYCODONE HYDROCHLORIDE AND ACETAMINOPHEN 1 TABLET: 5; 325 TABLET ORAL at 22:32

## 2017-10-06 RX ADMIN — Medication 500 MG: at 16:30

## 2017-10-06 NOTE — OB LABOR/OXYTOCIN SAFETY PROGRESS
Oxytocin Safety Progress Check Note - Olena Butler 28 y o  female MRN: [id number]    Unit/Bed#: L&D 322-01 Encounter: [id number]    Obstetric History       T0      L3     SAB1   TAB0   Ectopic0   Multiple0   Live Births3      Gestational Age: 39w1d  Dose (erica-units/min) Oxytocin: 20 erica-units/min  Contraction Frequency (minutes): 2-3  Contraction Quality: Moderate  Tachysystole: No   Dilation: 6-7        Effacement (%): 90  Station: -1  Baseline Rate: 135 bpm     FHR Category: Category II          Notes/comments:   Pt complains of urge to push  Advised pt that she is not fully dilated yet  Peanut ball placed and pt turned to side   Will re-evaluate prn          Yue Rasheed MD 10/6/2017 5:56 AM

## 2017-10-06 NOTE — ANESTHESIA POSTPROCEDURE EVALUATION
Post-Op Assessment Note      CV Status:  Stable    Mental Status:  Alert and awake    Hydration Status:  Euvolemic    PONV Controlled:  Controlled    Airway Patency:  Patent    Post Op Vitals Reviewed: Yes        Post-op block assessment: no complications        /03 (10/06/17 0750)    Temp      Pulse 98 (10/06/17 0750)   Resp      SpO2

## 2017-10-06 NOTE — LACTATION NOTE
This note was copied from a baby's chart  CONSULT - LACTATION  Unique Contreras 0 days male MRN: [id number]    [facility] NURSERY Room / Bed: L&D 306(N)/L&D 306(N) Encounter: [id number]    Maternal Information     MOTHER:  Zabrina Fuller  Maternal Age: 28 y o    OB History: #: 1, Date: , Sex: None, Weight: None, GA: 18w0d, Delivery: Spontaneous , Apgar1: None, Apgar5: None, Living: None, Birth Comments: None    #: 2, Date: , Sex: Female, Weight: 1332 g (2 lb 15 oz), GA: 28w0d, Delivery: Vaginal, Spontaneous Delivery, Apgar1: None, Apgar5: None, Living: Living, Birth Comments: None    #: 3, Date: , Sex: None, Weight: None, GA: 6w0d, Delivery: Therapeutic , Apgar1: None, Apgar5: None, Living: None, Birth Comments: None    #: 4, Date: , Sex: Female, Weight: 3544 g (7 lb 13 oz), GA: 34w0d, Delivery: Vaginal, Spontaneous Delivery, Apgar1: None, Apgar5: None, Living: Living, Birth Comments: None    #: 5, Date: , Sex: Female, Weight: 3544 g (7 lb 13 oz), GA: 36w0d, Delivery: Vaginal, Spontaneous Delivery, Apgar1: None, Apgar5: None, Living: Living, Birth Comments: None    #: 6, Date: , Sex: None, Weight: None, GA: 5w0d, Delivery: Spontaneous , Apgar1: None, Apgar5: None, Living: None, Birth Comments: None    #: 7, Date: 10/06/17, Sex: Male, Weight: 3487 g (7 lb 11 oz), GA: 39w1d, Delivery: Vaginal, Spontaneous Delivery, Apgar1: 8, Apgar5: 9, Living: Living, Birth Comments: None   Previouse breast reduction surgery? No    Lactation history:   Has patient previously breast fed: No (she excl  pumped for her first child)   How long had patient previously breast fed:     Previous breast feeding complications:     No past surgical history on file       Birth information:  YOB: 2017   Time of birth: 6:11 AM   Sex: male   Delivery type: Vaginal, Spontaneous Delivery   Birth Weight: 3487 g (7 lb 11 oz)   Percent of Weight Change: 0%     Gestational Age: 36w3d   [unfilled]    Assessment     Breast and nipple assessment: did not assess at this time    [place] Assessment: did not assess at this time    Feeding assessment: feeding well  LATCH:  Latch: Grasps breast, tongue down, lips flanged, rhythmic sucking   Audible Swallowing: Spontaneous and intermittent (24 hours old)   Type of Nipple: Everted (After stimulation)   Comfort (Breast/Nipple): Soft/non-tender   Hold (Positioning): No assist from staff, mother able to position/hold infant   LATCH Score: 10          Feeding recommendations:  breast feed on demand     Breastfeeding booklets given and reviewed with patient  Patient verbalized breastfeeding is going well  Enc to call for assistance as needed,phone # given      Pascale Morrissey RN 10/6/2017 10:37 AM

## 2017-10-06 NOTE — OB LABOR/OXYTOCIN SAFETY PROGRESS
Oxytocin Safety Progress Check Note - Lindsay Petty 28 y o  female MRN: [id number]    Unit/Bed#: L&D 322-01 Encounter: [id number]    Obstetric History       T0      L3     SAB1   TAB0   Ectopic0   Multiple0   Live Births3      Gestational Age: 39w1d  Dose (erica-units/min) Oxytocin: 18 erica-units/min  Contraction Frequency (minutes): 2-3  Contraction Quality: Mild  Tachysystole: No   Dilation: 4        Effacement (%): 80  Station: -2  Baseline Rate: 130 bpm     FHR Category: Category I          Notes/comments:   Pt complains of SROM and new onset painful contractions  Desires epidural   Anesthesia notified            Antoni Daniel MD 10/6/2017 4:22 AM

## 2017-10-06 NOTE — PLAN OF CARE
Knowledge Deficit     Verbalizes understanding of labor plan Progressing        Labor & Delivery     Manages discomfort Progressing     Patient vital signs are stable Progressing

## 2017-10-06 NOTE — L&D DELIVERY NOTE
Delivery Summary - OB/GYN   Jero Zuniga 28 y o  female MRN: [id number]  Unit/Bed#: L&D 322-01 Encounter: [id number]    Pre-delivery Diagnosis:   1  39w1d pregnancy  2  DVT in pregnancy  3  GDMA1  4  H o PTD   5  GBS positive by culture      Post-delivery Diagnosis: same, delivered    Attending: Sharlene Larsen MD     Assistant(s): Aditi Brownlee MD    Procedure:     Anesthesia: epidural    Estimated Blood Loss:  250 mL    Specimens:   1  Arterial and venous cord gases  2  Cord blood  3  Segment of umbilical cord  4  Placenta to storage     Complications:  None apparent    Findings:  1  Viable male  delivered on 10/06/17 at 0611   weight pending;  Apgar scores of 8 at one minute and 9 at five minutes  2  Spontaneous delivery of placenta with centrally inserted 3-vessel cord       Disposition: Patient tolerated the procedure well and was recovering in labor and delivery room with family and  before being transferred to the post-partum floor  Procedure Details     Description of procedure    After pushing for 5 minutes, on 10/06/17 at 0611 patient delivered a viable male , weight pending, Apgars of 8 (1 min) and 9 (5 min)  The fetal vertex delivered spontaneously  There was a double nuchal cord  These were reduced prior to delivery of the shoulders  The anterior shoulder delivered atraumatically with maternal expulsive forces and the assistance of downward traction  The posterior shoulder delivered with maternal expulsive forces and the assistance of upward traction  The remainder of the fetus delivered spontaneously  Upon delivery, the infant was placed on the mothers abdomen and the cord was clamped and cut  The infant was noted to cry spontaneously and was moving all extremities appropriately  There was no evidence for injury  Awaiting nurse resuscitators evaluated the  at bedside  Arterial and venous cord blood gases and cord blood was collected for analysis  These were promptly sent to the lab  In the immediate post-partum, 30 units of IV pitocin was administered and the uterus was noted to contract down well with massage and pitocin  The placenta delivered spontaneously at 0615 and was noted to have a centrally inserted 3 vessel cord  The vagina, cervix, and perineum were inspected and there was noted to be no lacerations  At the conclusion of the delivery, all needle, sponge, and instrument counts were noted to be correct  Patient tolerated the procedure well and was allowed to recover in labor and delivery room with family and  before being transferred to the post-partum floor      William Pedroza MD

## 2017-10-06 NOTE — ANESTHESIA PREPROCEDURE EVALUATION
Review of Systems/Medical History  Patient summary reviewed        Cardiovascular  Negative cardio ROS    Pulmonary  Negative pulmonary ROS Asthma: well controlled/ stable , ,        GI/Hepatic  Negative GI/hepatic ROS          Negative  ROS        Endo/Other  Negative endo/other ROS Diabetes gestational ,      GYN  Negative gynecology ROS          Hematology  Negative hematology ROS      Musculoskeletal  Negative musculoskeletal ROS        Neurology  Negative neurology ROS      Psychology   Negative psychology ROS            Physical Exam    Airway    Mallampati score: II  TM Distance: >3 FB  Neck ROM: full     Dental       Cardiovascular  Comment: Negative ROS, Cardiovascular exam normal    Pulmonary  Pulmonary exam normal     Other Findings        Anesthesia Plan  ASA Score- 3       Anesthesia Type- epidural        Induction-       Informed Consent  Anesthetic plan and risks discussed with patient
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
## 2017-10-06 NOTE — OB LABOR/OXYTOCIN SAFETY PROGRESS
Patient was evaluated at approximately midnight on 10/06/2017  Was alerted by nursing the patient is feeling uncomfortable and requesting something for pain  Discussed with patient options for IV medications for pain control  At that time patient stated that she was just feeling more uncomfortable rather than having more abdominal pain  At this time she would prefer to not have IV medication and is requesting for a heating pad for her back discomfort  Cervical exam was done to evaluate patient due to request for pain medication and concern for becoming more uncomfortable  Cervical exam revealed cervix to be unchanged at this time  Pitocin currently at a rate of 8 milliunits per minute  Patient is silver approximately every 2-3 minutes  Contractions are lasting approximately 60-90 seconds  Fetal heart tracing continues to be reassuring with a baseline of 120 beats per minute, moderate variability, 15 x 15 accelerations, no decelerations visualized  Category 1  At this time will continue Pitocin titration for induction of labor  Plan to recheck patient when she becomes notably more uncomfortable with contractions or earlier if concern for maternal or fetal distress  Jena Padron MD  OBGYN, PGY3  10/6/2017 12:38 AM

## 2017-10-06 NOTE — PLAN OF CARE
Knowledge Deficit     Verbalizes understanding of labor plan Completed        Labor & Delivery     Manages discomfort Completed     Patient vital signs are stable Completed

## 2017-10-06 NOTE — OB LABOR/OXYTOCIN SAFETY PROGRESS
Oxytocin Safety Progress Check Note - Rivas Olivares 28 y o  female MRN: [id number]    Unit/Bed#: L&D 322-01 Encounter: [id number]    Obstetric History       T0      L3     SAB1   TAB0   Ectopic0   Multiple0   Live Births3      Gestational Age: 39w1d  Dose (erica-units/min) Oxytocin: 22 erica-units/min  Contraction Frequency (minutes): 2-3  Contraction Quality: Mild, Moderate  Tachysystole: No   Dilation: 4        Effacement (%): 100  Station: -1  Baseline Rate: 135 bpm     FHR Category: Category I          Notes/comments:      Pt complains of pubic pressure with contractions  Pain much improved with epidural    Decrease pitocin to 20 mu/hr as some contractions are 1-2 minutes, while most are 2-3  Continue IOL         Flavio Mccartney MD 10/6/2017 5:45 AM

## 2017-10-06 NOTE — OB LABOR/OXYTOCIN SAFETY PROGRESS
Pitocin titration currently at rate of 16 milliunits per minute  Uterine contractions occurring every 2-3 minutes  Patient reports some discomfort with contractions but is not requesting pain medications at this time  Cervical exam deferred at this time  [place]:  Q 2-3 minutes  FHT:  125 beats per minute, moderate variability, accels observed, decelerations absent  Category 1  Will continue induction of labor with Pitocin titration  Pitocin to be increased by 2 milliunits per minute every 20 minutes until contraction pattern established  Continue to monitor for uterine tachysystole  Jena Barton MD  OBGYN, PGY3  10/6/2017 2:50 AM

## 2017-10-06 NOTE — ANESTHESIA PROCEDURE NOTES
Epidural Block    Patient location during procedure: OB  Start time: 10/6/2017 4:31 AM  Reason for block: procedure for pain, at surgeon's request, post-op pain management and primary anesthetic  Staffing  Anesthesiologist: Erskine Osler  Performed: anesthesiologist   Preanesthetic Checklist  Completed: patient identified, site marked, surgical consent, pre-op evaluation, timeout performed, IV checked, risks and benefits discussed and monitors and equipment checked  Epidural  Patient position: sitting  Prep: ChloraPrep  Patient monitoring: cardiac monitor and frequent blood pressure checks  Approach: midline  Location: lumbar (1-5)  Injection technique: AP air  Needle  Needle type: Tuohy   Needle gauge: 18 G  Catheter type: side hole  Catheter size: 20 G  Test dose: negative and lidocaine 1 5% with epinephrine 1-to-200,000  Assessment  Sensory level: [id number] aspiration for CSF, negative aspiration for heme and no paresthesia on injection  patient tolerated the procedure well with no immediate complications

## 2017-10-07 RX ADMIN — IBUPROFEN 600 MG: 600 TABLET, FILM COATED ORAL at 15:16

## 2017-10-07 RX ADMIN — OXYCODONE HYDROCHLORIDE AND ACETAMINOPHEN 1 TABLET: 5; 325 TABLET ORAL at 21:19

## 2017-10-07 RX ADMIN — ENOXAPARIN SODIUM 80 MG: 80 INJECTION SUBCUTANEOUS at 10:25

## 2017-10-07 RX ADMIN — OXYCODONE HYDROCHLORIDE AND ACETAMINOPHEN 1 TABLET: 5; 325 TABLET ORAL at 07:01

## 2017-10-07 RX ADMIN — IBUPROFEN 600 MG: 600 TABLET, FILM COATED ORAL at 03:22

## 2017-10-07 RX ADMIN — DOCUSATE SODIUM 100 MG: 100 CAPSULE, LIQUID FILLED ORAL at 10:24

## 2017-10-07 RX ADMIN — DOCUSATE SODIUM 100 MG: 100 CAPSULE, LIQUID FILLED ORAL at 17:03

## 2017-10-07 RX ADMIN — OXYCODONE HYDROCHLORIDE AND ACETAMINOPHEN 1 TABLET: 5; 325 TABLET ORAL at 17:03

## 2017-10-07 RX ADMIN — ENOXAPARIN SODIUM 80 MG: 80 INJECTION SUBCUTANEOUS at 21:19

## 2017-10-07 RX ADMIN — Medication 500 MG: at 00:12

## 2017-10-07 NOTE — PLAN OF CARE
Problem: PAIN - ADULT  Goal: Verbalizes/displays adequate comfort level or baseline comfort level  Interventions:  - Encourage patient to monitor pain and request assistance  - Assess pain using appropriate pain scale  - Administer analgesics based on type and severity of pain and evaluate response  - Implement non-pharmacological measures as appropriate and evaluate response  - Consider cultural and social influences on pain and pain management  - Notify physician/advanced practitioner if interventions unsuccessful or patient reports new pain   Outcome: Progressing      Problem: SAFETY ADULT  Goal: Patient will remain free of falls  INTERVENTIONS:  - Assess patient frequently for physical needs  -  Identify cognitive and physical deficits and behaviors that affect risk of falls    -  [place] fall precautions as indicated by assessment   - Educate patient/family on patient safety including physical limitations  - Instruct patient to call for assistance with activity based on assessment  - Modify environment to reduce risk of injury  - Consider OT/PT consult to assist with strengthening/mobility   Outcome: Progressing    Goal: Maintain or return to baseline ADL function  INTERVENTIONS:  -  Assess patient's ability to carry out ADLs; assess patient's baseline for ADL function and identify physical deficits which impact ability to perform ADLs (bathing, care of mouth/teeth, toileting, grooming, dressing, etc )  - Assess/evaluate cause of self-care deficits   - Assess range of motion  - Assess patient's mobility; develop plan if impaired  - Assess patient's need for assistive devices and provide as appropriate  - Encourage maximum independence but intervene and supervise when necessary  ¯ Involve family in performance of ADLs  ¯ Assess for home care needs following discharge   ¯ Request OT consult to assist with ADL evaluation and planning for discharge  ¯ Provide patient education as appropriate   Outcome: Progressing

## 2017-10-07 NOTE — PROGRESS NOTES
Postpartum Progress Note - OB/GYN   Amrik Rivera 28 y o  female MRN: [id number]  Unit/Bed#: L&D 306-01 Encounter: [id number]    ASSESSMENT:  Amrik Rivera 28 y o  [id number] s/p Spontaneous Vaginal Delivery Postpartum day  1  Pt is well appearing and with no current complaints  Hx of DVT on lovenox 80mg BID  Denies leg pain/calf tenderness, CP, SOB, dyspnea, or diaphoresis  PLAN:  1) Continue routine postpartum care  2) Encourage ambulation  3) Pain control as needed  4) Advance diet as tolerated  5) Dispo: stable and comfortable    Subjective/Objective     SUBJECTIVE:  Pain: no  Tolerating Oral Intake: yes   Voiding: yes  Flatus: yes  Bowel Movement: no  Ambulating: yes  Breastfeeding: yes  Chest Pain: no  Shortness of Breath: no  Leg Pain/Discomfort: no  Lochia: minimal    OBJECTIVE:     Vitals: Blood pressure 107/69, pulse 69, temperature 97 8 °F (36 6 °C), temperature source Oral, resp  rate 16, height 5' 1" (1 549 m), weight 80 7 kg (178 lb), SpO2 97 %, currently breastfeeding       General: appears well, alert and oriented x 3, pleasant and cooperative  Cardiovascular: RRR, normal S1/S2, no GRM  Lungs:  clear to auscultation bilaterally; no WRR, non-labored breathing   Abdomen: normal bowel sounds, soft, no tenderness, no distention  : Uterine fundus firm: -2 cm below the umbilicus  Lower Extremities: Non-tender, peripheral pulses normal; no clubbing, cyanosis, or edema    Labs:   Lab Results   Component Value Date    WBC 10 78 (H) 10/05/2017    HGB 11 1 (L) 10/05/2017    HCT 33 6 (L) 10/05/2017    MCV 85 10/05/2017     (H) 10/05/2017       Medications:   Current Facility-Administered Medications   Medication Dose Route Frequency    acetaminophen (TYLENOL) tablet 650 mg  650 mg Oral Q4H PRN    benzocaine-menthol-lanolin-aloe (DERMOPLAST) 20-0 5 % topical spray 1 application  1 application Topical 4x Daily PRN    calcium carbonate (TUMS) chewable tablet 500 mg  500 mg Oral Daily PRN    diphenhydrAMINE (BENADRYL) tablet 25 mg  25 mg Oral Q6H PRN    docusate sodium (COLACE) capsule 100 mg  100 mg Oral BID    enoxaparin (LOVENOX) subcutaneous injection 80 mg  80 mg Subcutaneous Q12H [address]    hydrocortisone 1 % cream 1 application  1 application Topical PRN    ibuprofen (MOTRIN) tablet 600 mg  600 mg Oral Q6H PRN    ondansetron (ZOFRAN) injection 4 mg  4 mg Intravenous Q8H PRN    oxyCODONE-acetaminophen (PERCOCET) 5-325 mg per tablet 1 tablet  1 tablet Oral Q4H PRN    oxyCODONE-acetaminophen (PERCOCET) 5-325 mg per tablet 2 tablet  2 tablet Oral Q4H PRN    ROPivacaine 0 2% PCEA   Epidural Continuous    simethicone (MYLICON) chewable tablet 80 mg  80 mg Oral 4x Daily PRN    witch hazel-glycerin (TUCKS) topical pad 1 pad  1 pad Topical PRN     Invasive Devices          No matching active lines, drains, or airways               Marcy Street MD PGY-2   10/7/2017 10:50 AM

## 2017-10-08 VITALS
BODY MASS INDEX: 33.61 KG/M2 | DIASTOLIC BLOOD PRESSURE: 67 MMHG | OXYGEN SATURATION: 98 % | HEIGHT: 61 IN | SYSTOLIC BLOOD PRESSURE: 98 MMHG | HEART RATE: 72 BPM | WEIGHT: 178 LBS | RESPIRATION RATE: 19 BRPM | TEMPERATURE: 97.8 F

## 2017-10-08 RX ORDER — IBUPROFEN 600 MG/1
600 TABLET ORAL EVERY 6 HOURS PRN
Qty: 30 TABLET | Refills: 0
Start: 2017-10-08

## 2017-10-08 RX ORDER — ACETAMINOPHEN 325 MG/1
TABLET ORAL
Qty: 30 TABLET | Refills: 0
Start: 2017-10-08

## 2017-10-08 RX ORDER — DIAPER,BRIEF,INFANT-TODD,DISP
1 EACH MISCELLANEOUS AS NEEDED
Qty: 30 G | Refills: 0
Start: 2017-10-08

## 2017-10-08 RX ORDER — DOCUSATE SODIUM 100 MG/1
100 CAPSULE, LIQUID FILLED ORAL 2 TIMES DAILY
Qty: 10 CAPSULE | Refills: 0
Start: 2017-10-08

## 2017-10-08 RX ADMIN — OXYCODONE HYDROCHLORIDE AND ACETAMINOPHEN 1 TABLET: 5; 325 TABLET ORAL at 05:49

## 2017-10-08 RX ADMIN — DOCUSATE SODIUM 100 MG: 100 CAPSULE, LIQUID FILLED ORAL at 08:49

## 2017-10-08 RX ADMIN — OXYCODONE HYDROCHLORIDE AND ACETAMINOPHEN 1 TABLET: 5; 325 TABLET ORAL at 11:08

## 2017-10-08 RX ADMIN — ENOXAPARIN SODIUM 80 MG: 80 INJECTION SUBCUTANEOUS at 08:49

## 2017-10-08 NOTE — PLAN OF CARE
Problem: PAIN - ADULT  Goal: Verbalizes/displays adequate comfort level or baseline comfort level  Interventions:  - Encourage patient to monitor pain and request assistance  - Assess pain using appropriate pain scale  - Administer analgesics based on type and severity of pain and evaluate response  - Implement non-pharmacological measures as appropriate and evaluate response  - Consider cultural and social influences on pain and pain management  - Notify physician/advanced practitioner if interventions unsuccessful or patient reports new pain   Outcome: Progressing      Problem: SAFETY ADULT  Goal: Patient will remain free of falls  INTERVENTIONS:  - Assess patient frequently for physical needs  -  Identify cognitive and physical deficits and behaviors that affect risk of falls    -  [place] fall precautions as indicated by assessment   - Educate patient/family on patient safety including physical limitations  - Instruct patient to call for assistance with activity based on assessment  - Modify environment to reduce risk of injury  - Consider OT/PT consult to assist with strengthening/mobility   Outcome: Progressing    Goal: Maintain or return to baseline ADL function  INTERVENTIONS:  -  Assess patient's ability to carry out ADLs; assess patient's baseline for ADL function and identify physical deficits which impact ability to perform ADLs (bathing, care of mouth/teeth, toileting, grooming, dressing, etc )  - Assess/evaluate cause of self-care deficits   - Assess range of motion  - Assess patient's mobility; develop plan if impaired  - Assess patient's need for assistive devices and provide as appropriate  - Encourage maximum independence but intervene and supervise when necessary  ¯ Involve family in performance of ADLs  ¯ Assess for home care needs following discharge   ¯ Request OT consult to assist with ADL evaluation and planning for discharge  ¯ Provide patient education as appropriate   Outcome: Progressing    Goal: Maintain or return mobility status to optimal level  INTERVENTIONS:  - Assess patient's baseline mobility status (ambulation, transfers, stairs, etc )    - Identify cognitive and physical deficits and behaviors that affect mobility  - Identify mobility aids required to assist with transfers and/or ambulation (gait belt, sit-to-stand, lift, walker, cane, etc )  - [place] fall precautions as indicated by assessment  - Record patient progress and toleration of activity level on Mobility SBAR; progress patient to next Phase/Stage  - Instruct patient to call for assistance with activity based on assessment  - Request Rehabilitation consult to assist with strengthening/weightbearing, etc    Outcome: Progressing      Problem: DISCHARGE PLANNING  Goal: Discharge to home or other facility with appropriate resources  INTERVENTIONS:  - Identify barriers to discharge w/patient and caregiver  - Arrange for needed discharge resources and transportation as appropriate  - Identify discharge learning needs (meds, wound care, etc )  - Arrange for interpretive services to assist at discharge as needed  - Refer to Case Management Department for coordinating discharge planning if the patient needs post-hospital services based on physician/advanced practitioner order or complex needs related to functional status, cognitive ability, or social support system   Outcome: Progressing

## 2017-10-08 NOTE — DISCHARGE SUMMARY
Discharge Summary -   Karl Hernandez 28 y o  female MRN: [id number]  Unit/Bed#: L&D 502-30 Encounter: [id number]    Admission Date: 10/5/2017     Discharge Date: 10/8/2017    Attending: Dr Wolf Plata Diagnosis:   Term pregnancy  Induced labor  Single fetus  A1 Gestational DM  GBS positive  Hx LLE DVT during pregnancy    Secondary Diagnosis:   Same as above, delivered    Procedures: Spontaneous vaginal delivery    Hospital Course:   Karl Hernandez is a 28 y o  [id number] at 36w3d who was initially admitted for induction of labor  She delivered a viable male  on 10/6/17 at 60 124 37 75  Weight 7lbs 11oz via normal spontaneous vaginal delivery  She sustained no laceration during delivery  Apgars were 8 (1 min) and 9 (5 min)   was transferred to  nursery  Patient tolerated the procedure well  Her post-delivery course was uncomplicated  Her postpartum pain was well controlled with oral analgesics  On day of discharge, she was ambulating and able to reasonably perform all ADLs  She was voiding and had appropriate bowel function  Pain was well controlled  She was discharged home on postpartum day #2 without complications  Patient was instructed to follow up with her OB as an outpatient and was given appropriate warnings to call provider if she develops signs of infection or uncontrolled pain  Complications: None    Condition at discharge: stable     Discharge Medications: For a complete list of the patient's medications, please refer to her medical reconcillation report  Discharge instructions/Information to patient and family:   See after visit summary for information provided to patient and family  Provisions for Follow-Up Care:  See after visit summary for information related to follow-up care and any pertinent home health orders  Disposition: See After Visit Summary for discharge disposition information      Planned Readmission: Sienna Link   10/8/2017  6:41 AM

## 2017-10-08 NOTE — PROGRESS NOTES
Progress Note - OB/GYN   Meghan Bodily 28 y o  female MRN: [id number]  Unit/Bed#: L&D 306-01 Encounter: [id number]    Assessment:  Term pregnancy, s/p , PPD#2  Hx DVT    Plan:  Continue routine postpartum care  Hx DVT: August  Cont therapeutic Lovenox of 80mg BID for 6w postpartum  Discharge to home    Subjective/Objective   Chief Complaint: I'm doing find    Subjective: Pt reports feeling well today  Ambulating  Tolerating PO  Voiding without difficulty  No BM yet but passing flatus  Lochia improving  Denies HA, CP, SOB, extremity pain  Objective:   Vitals: Blood pressure 98/67, pulse 72, temperature 97 8 °F (36 6 °C), temperature source Oral, resp  rate 19, height 5' 1" (1 549 m), weight 80 7 kg (178 lb), SpO2 98 %, currently breastfeeding  ,Body mass index is 33 63 kg/m²  No intake or output data in the 24 hours ending 10/08/17 0811    Invasive Devices          No matching active lines, drains, or airways          Physical Exam:   Gen: AaOx3, NAD, pleasant  Abd: Soft, nontender, nondistended  : Fundus firm, nontender, located at -1cm below umbilicus  Extremities: No edema, nontender, Negative Cecilia's bilaterally      Lab, Imaging and other studies: I have personally reviewed pertinent reports              Maurice Bernard DO  OB/GYN, PGY2  10/8/2017, 8:13 AM

## 2017-10-08 NOTE — PLAN OF CARE
Problem: PAIN - ADULT  Goal: Verbalizes/displays adequate comfort level or baseline comfort level  Interventions:  - Encourage patient to monitor pain and request assistance  - Assess pain using appropriate pain scale  - Administer analgesics based on type and severity of pain and evaluate response  - Implement non-pharmacological measures as appropriate and evaluate response  - Consider cultural and social influences on pain and pain management  - Notify physician/advanced practitioner if interventions unsuccessful or patient reports new pain   Outcome: Completed Date Met: 10/08/17      Problem: SAFETY ADULT  Goal: Patient will remain free of falls  INTERVENTIONS:  - Assess patient frequently for physical needs  -  Identify cognitive and physical deficits and behaviors that affect risk of falls    -  [place] fall precautions as indicated by assessment   - Educate patient/family on patient safety including physical limitations  - Instruct patient to call for assistance with activity based on assessment  - Modify environment to reduce risk of injury  - Consider OT/PT consult to assist with strengthening/mobility   Outcome: Completed Date Met: 10/08/17    Goal: Maintain or return to baseline ADL function  INTERVENTIONS:  -  Assess patient's ability to carry out ADLs; assess patient's baseline for ADL function and identify physical deficits which impact ability to perform ADLs (bathing, care of mouth/teeth, toileting, grooming, dressing, etc )  - Assess/evaluate cause of self-care deficits   - Assess range of motion  - Assess patient's mobility; develop plan if impaired  - Assess patient's need for assistive devices and provide as appropriate  - Encourage maximum independence but intervene and supervise when necessary  ¯ Involve family in performance of ADLs  ¯ Assess for home care needs following discharge   ¯ Request OT consult to assist with ADL evaluation and planning for discharge  ¯ Provide patient education as appropriate   Outcome: Completed Date Met: 10/08/17    Goal: Maintain or return mobility status to optimal level  INTERVENTIONS:  - Assess patient's baseline mobility status (ambulation, transfers, stairs, etc )    - Identify cognitive and physical deficits and behaviors that affect mobility  - Identify mobility aids required to assist with transfers and/or ambulation (gait belt, sit-to-stand, lift, walker, cane, etc )  - [place] fall precautions as indicated by assessment  - Record patient progress and toleration of activity level on Mobility SBAR; progress patient to next Phase/Stage  - Instruct patient to call for assistance with activity based on assessment  - Request Rehabilitation consult to assist with strengthening/weightbearing, etc    Outcome: Completed Date Met: 10/08/17      Problem: DISCHARGE PLANNING  Goal: Discharge to home or other facility with appropriate resources  INTERVENTIONS:  - Identify barriers to discharge w/patient and caregiver  - Arrange for needed discharge resources and transportation as appropriate  - Identify discharge learning needs (meds, wound care, etc )  - Arrange for interpretive services to assist at discharge as needed  - Refer to Case Management Department for coordinating discharge planning if the patient needs post-hospital services based on physician/advanced practitioner order or complex needs related to functional status, cognitive ability, or social support system   Outcome: Completed Date Met: 10/08/17

## 2017-10-10 DIAGNOSIS — R07.81 PLEURODYNIA: ICD-10-CM

## 2017-10-11 ENCOUNTER — TRANSCRIBE ORDERS (OUTPATIENT)
Dept: ADMINISTRATIVE | Facility: HOSPITAL | Age: 32
End: 2017-10-11

## 2017-10-11 ENCOUNTER — APPOINTMENT (OUTPATIENT)
Dept: RADIOLOGY | Facility: MEDICAL CENTER | Age: 32
End: 2017-10-11
Payer: COMMERCIAL

## 2017-10-11 DIAGNOSIS — R07.81 PLEURODYNIA: ICD-10-CM

## 2017-10-11 PROCEDURE — 71101 X-RAY EXAM UNILAT RIBS/CHEST: CPT

## 2017-10-12 ENCOUNTER — ALLSCRIPTS OFFICE VISIT (OUTPATIENT)
Dept: OTHER | Facility: OTHER | Age: 32
End: 2017-10-12

## 2017-10-16 LAB — PLACENTA IN STORAGE: NORMAL

## 2017-11-10 ENCOUNTER — ALLSCRIPTS OFFICE VISIT (OUTPATIENT)
Dept: OTHER | Facility: OTHER | Age: 32
End: 2017-11-10

## 2017-11-13 DIAGNOSIS — M79.10 MYALGIA: ICD-10-CM

## 2017-11-13 DIAGNOSIS — O22.30 DEEP PHLEBOTHROMBOSIS DURING PREGNANCY: ICD-10-CM

## 2017-11-13 DIAGNOSIS — M79.645 PAIN OF FINGER OF LEFT HAND: ICD-10-CM

## 2017-11-13 DIAGNOSIS — M25.571 PAIN IN RIGHT ANKLE: ICD-10-CM

## 2017-11-14 ENCOUNTER — HOSPITAL ENCOUNTER (OUTPATIENT)
Dept: NON INVASIVE DIAGNOSTICS | Facility: HOSPITAL | Age: 32
Discharge: HOME | End: 2017-11-14
Payer: COMMERCIAL

## 2017-11-14 DIAGNOSIS — O22.30 DEEP PHLEBOTHROMBOSIS DURING PREGNANCY: ICD-10-CM

## 2017-11-14 PROCEDURE — 93971 EXTREMITY STUDY: CPT
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
## 2017-12-11 ENCOUNTER — ALLSCRIPTS OFFICE VISIT (OUTPATIENT)
Dept: OTHER | Facility: OTHER | Age: 32
End: 2017-12-11

## 2017-12-12 NOTE — PROGRESS NOTES
Assessment    1  Fibromyalgia syndrome (729 1) (M79 7)   2  Pain of left thumb (729 5) (M79 645)   3  Rhomboid muscle pain (729 1) (M79 1)   4  Right ankle pain (719 47) (M25 571)    Plan  Pain of left thumb, Rhomboid muscle pain, Right ankle pain    · Physical Therapy Referral Other Co-Management  *  Status: Active  Requested for:02Mdn4257  are Referring to a non- Preferred Provider : Patient refused suggestion for    recommended provider  Care Summary provided  : Yes  PMH: Bilateral hip pain, Chronic lumbar pain, Depression    · Cyclobenzaprine HCl - 10 MG Oral Tablet; TAKE 1 TABLET Bedtime PRN musclespasms  Rib pain on right side    · Oxycodone-Acetaminophen 5-325 MG Oral Tablet; TAKE 1 TABLET Every 8 hoursPRN    Discussion/Summary    Pt is a 27 yo FMultiple arthralgias/chronic pain syndrome -reviewed patient's symptoms with her today  It appears that her symptoms are secondary to her chronic musculoskeletal complaints however have been exacerbated secondary to the increased work she has been doing taking care of her  son  She has had extensive workups in the past for these problems  At this time, she was advised that she would benefit greatly from physical therapy  Referral was given today  She may continue with her Percocet  She was given also a prescription for her Flexeril  Follow-up in 2-3 months  Chief Complaint  Patient presents for a 1 month f/u  She stated that she would also like to discuss multiple areas of pain that has been worsening  History of Present Illness  Patient is a 59-year-old female presents today for a follow-up on her persistent cream  She states that she has been having pain in multiple locations of her body  Most of her pain is mainly located over her right shoulder blade, right ankle, left wrist as well as her low back  She states that her pain has been exacerbated secondary to the care of her  son  has been under significant stress secondary to multiple social problems  She states that her fiance has left her secondary to the falls protruding tests from her  son  Patient states that 1 year ago, she was having a panic attack  She was drinking during that time however to treat her panic symptoms, she took alprazolam  After this episode, she states that she went out with her female friend  She currently does not recall any other events  She states that she woke up in a hotel room  Shortly after this, she found out that she was pregnant  Review of Systems   Constitutional: not feeling poorly-- and-- not feeling tired  Eyes: no eyesight problems-- and-- no purulent discharge from the eyes  ENT: no nosebleeds,-- no sore throat-- and-- no nasal discharge  Cardiovascular: no chest pain-- and-- no palpitations  Respiratory: no cough-- and-- no shortness of breath during exertion  Gastrointestinal: no nausea-- and-- no diarrhea  Genitourinary: no pelvic pain-- and-- no dysmenorrhea  Musculoskeletal: arthralgias-- and-- myalgias, but-- no joint swelling  Integumentary: no rashes-- and-- no skin lesions  Neurological: no headache,-- no numbness-- and-- no dizziness  Psychiatric: anxiety-- and-- depression  Endocrine: no muscle weakness-- and-- no deepening of the voice  Hematologic/Lymphatic: no tendency for easy bleeding-- and-- no tendency for easy bruising  Active Problems    1  Chronic constipation (564 00) (K59 09)   2  Chronic lumbar pain (724 2,338 29) (M54 5,G89 29)   3  Chronic osteoarthritis (715 90) (M19 90)   4  Depression (311) (F32 9)   5  DVT (deep vein thrombosis) in pregnancy (671 30) (O22 30,I82 409)   6  Eczema (692 9) (L30 9)   7  Extremity numbness (782 0) (R20 0)   8  Fibromyalgia syndrome (729 1) (M79 7)   9  Gestational diabetes mellitus, currently pregnant (648 83) (O24 419)   10  H/O  delivery, currently pregnant (V23 41) (O09 219)   11  Heart burn (787 1) (R12)   12  Hyperreflexia (796 1) (R29 2)   13   Low grade squamous intraepithelial lesion (LGSIL) on cervical Pap smear (795 03)  (R87 612)   14  Mild asthma (493 90) (J45 998)   15  Pain syndrome, chronic (338 4) (G89 4)   16  Panic anxiety syndrome (300 01) (F41 0)   17  PTSD (post-traumatic stress disorder) (309 81) (F43 10)   18  Reactive airway disease (493 90) (J45 909)   19  Rib pain on right side (786 50) (R07 81)   20  Seizure disorder (345 90) (G40 909)    Past Medical History  1  History of Bacterial vaginosis (616 10,041 9) (N76 0,B96 89)   2  History of Encounter for long-term use of opiate analgesic (V58 69) (Z79 891)   3  History of Encounter for pregnancy related examination in third trimester (V22 1) (Z34 93)   4  History of allergic rhinitis (V12 69) (Z87 09)   5  History of anxiety disorder (V11 8) (Z86 59)   6  History of bleeding disorder (V12 3) (Z86 2)   7  History of depression (V11 8) (Z86 59)   8  History of influenza vaccination (V49 89) (Z92 29)   9  History of influenza vaccination (V49 89) (Z92 29)   10  History of pregnancy (V13 29)   11  Denied: History of substance abuse    The active problems and past medical history were reviewed and updated today  Surgical History  1  History of Treatment Of Lower Leg Fracture    The surgical history was reviewed and updated today  Family History  Mother    1  Denied: Family history of substance abuse  Father    2  Family history of Bipolar disorder   3  Denied: Family history of substance abuse  Paternal Aunt    4  Family history of Seizures  Family History    5  Family history of Back disorder   6  Family history of diabetes mellitus (V18 0) (Z83 3)   7  Family history of heart disease (V17 49) (Z82 49)   8  Family history of hypertension (V17 49) (Z82 49)   9  Family history of malignant neoplasm (V16 9) (Z80 9)   10  Family history of neuropathy (V17 2) (Z82 0)   11  Family history of thyroid disease (V18 19) (Z83 49)    The family history was reviewed and updated today         Social History     · Current every day smoker (305 1) (F17 200)   · Former smoker (V15 82) (L52 070)   · No alcohol use  The social history was reviewed and updated today  The social history was reviewed and is unchanged  Current Meds   1  Oxycodone-Acetaminophen 5-325 MG Oral Tablet; TAKE 1 TABLET Every 8 hours PRN; Therapy: 91ZHC8404 to ([phone]); Last Rx:10Oct2017 Ordered   2  Shahnaz Ivy; Therapy: (Evie Flick) to Recorded   3  Ventolin  (90 Base) MCG/ACT Inhalation Aerosol Solution; INHALE 2 PUFFS BY MOUTH EVERY 4 TO 6 HOURS  SPACE 60 SECONDS APART; Therapy: 41Nwz9431 to (Last Rx:12Oct2017)  Requested for: 12Oct2017 Ordered    The medication list was reviewed and updated today  Allergies  1  LaMICtal TABS   2  Vicodin TABS  3  Adhesive Tape   4  FRUIT   5  Other   6  Seasonal   7  VEGETABLES    Vitals  Vital Signs    Recorded: 11Dec2017 03:51PM   Temperature 98 F, Tympanic   Heart Rate 79   Pulse Quality Normal   Systolic 828, LUE, Sitting   Diastolic 80, LUE, Sitting   Height 5 ft 2 in   Weight 152 lb 7 oz   BMI Calculated 27 88   BSA Calculated 1 7   O2 Saturation 98, RA       Physical Exam   Constitutional  General appearance: No acute distress, well appearing and well nourished  Eyes  Conjunctiva and lids: No swelling, erythema or discharge  Pupils and irises: Equal, round and reactive to light  Pulmonary  Respiratory effort: No increased work of breathing or signs of respiratory distress  Abdomen  Abdomen: Non-tender, no masses  Liver and spleen: No hepatomegaly or splenomegaly  Lymphatic  Palpation of lymph nodes in neck: No lymphadenopathy  Musculoskeletal  Gait and station: Normal    Inspection/palpation of joints, bones, and muscles: Normal    Skin  Skin and subcutaneous tissue: Normal without rashes or lesions  Neurologic  Cranial nerves: Cranial nerves 2-12 intact  Sensation: No sensory loss           Results/Data  PHQ-9 Adult Depression Screening 57DAM7033 04:12PM User, [facility]     Test Name Result Flag Reference   PHQ-9 Adult Depression Score 7       Over the last two weeks, how often have you been bothered by any of the following problems? Little interest or pleasure in doing things: Several days - 1 Feeling down, depressed, or hopeless: Several days - 1 Trouble falling or staying asleep, or sleeping too much: Not at all - 0 Feeling tired or having little energy: Several days - 1 Poor appetite or over eating: Several days - 1 Feeling bad about yourself - or that you are a failure or have let yourself or your family down: Several days - 1 Trouble concentrating on things, such as reading the newspaper or watching television: Several days - 1 Moving or speaking so slowly that other people could have noticed  Or the opposite -  being so fidgety or restless that you have been moving around a lot more than usual: Several days - 1 Thoughts that you would be better off dead, or of hurting yourself in some way: Not at all - 0   PHQ-9 Adult Depression Screening Negative     PHQ-9 Difficulty Level Somewhat difficult     PHQ-9 Severity Mild Depression           Signatures   Electronically signed by :  Farhan Camp DO; Dec 11 2017  9:59PM EST                       (Author)

## 2017-12-19 ENCOUNTER — APPOINTMENT (OUTPATIENT)
Dept: PHYSICAL THERAPY | Facility: REHABILITATION | Age: 32
End: 2017-12-19
Payer: COMMERCIAL

## 2017-12-19 DIAGNOSIS — M25.571 PAIN IN RIGHT ANKLE: ICD-10-CM

## 2017-12-19 DIAGNOSIS — M79.645 PAIN OF FINGER OF LEFT HAND: ICD-10-CM

## 2017-12-19 DIAGNOSIS — M79.10 MYALGIA: ICD-10-CM

## 2017-12-19 PROCEDURE — G8978 MOBILITY CURRENT STATUS: HCPCS

## 2017-12-19 PROCEDURE — 97110 THERAPEUTIC EXERCISES: CPT

## 2017-12-19 PROCEDURE — G8979 MOBILITY GOAL STATUS: HCPCS

## 2017-12-19 PROCEDURE — 97163 PT EVAL HIGH COMPLEX 45 MIN: CPT

## 2017-12-22 ENCOUNTER — GENERIC CONVERSION - ENCOUNTER (OUTPATIENT)
Dept: PERINATAL CARE | Facility: MEDICAL CENTER | Age: 32
End: 2017-12-22

## 2017-12-26 ENCOUNTER — APPOINTMENT (OUTPATIENT)
Dept: PHYSICAL THERAPY | Facility: REHABILITATION | Age: 32
End: 2017-12-26
Payer: COMMERCIAL

## 2017-12-28 ENCOUNTER — APPOINTMENT (OUTPATIENT)
Dept: PHYSICAL THERAPY | Facility: REHABILITATION | Age: 32
End: 2017-12-28
Payer: COMMERCIAL

## 2017-12-28 PROCEDURE — 97110 THERAPEUTIC EXERCISES: CPT

## 2018-01-04 ENCOUNTER — APPOINTMENT (OUTPATIENT)
Dept: PHYSICAL THERAPY | Facility: REHABILITATION | Age: 33
End: 2018-01-04
Payer: COMMERCIAL

## 2018-01-08 ENCOUNTER — APPOINTMENT (OUTPATIENT)
Dept: PHYSICAL THERAPY | Facility: REHABILITATION | Age: 33
End: 2018-01-08
Payer: COMMERCIAL

## 2018-01-09 ENCOUNTER — APPOINTMENT (OUTPATIENT)
Dept: PHYSICAL THERAPY | Facility: REHABILITATION | Age: 33
End: 2018-01-09
Payer: COMMERCIAL

## 2018-01-09 NOTE — MISCELLANEOUS
Message   Recorded as Task   Date: 02/05/2016 02:43 PM, Created By: Michael Gardner   Task Name: Miscellaneous   Assigned To: Michael Gardner   Regarding Patient: Conor Jones, Status: Active   CommentToma Thorofare - 05 Feb 2016 2:43 PM     TASK CREATED  Patient missed appt  2/5/16 @ 2:15 PM with Dago  I called the patient and she missed the appt because she was incarcerated for driving without insurance  She rescheduled for 2/25/16 @ 8 AM with Dago  Patient will see PCP and obtain ins referral before her appt  on 2/25/16  Dago Rodriguez - 05 Feb 2016 3:59 PM     TASK REPLIED TO: Previously Assigned To Dago Rodriguez  Provider aware  Signatures   Electronically signed by :  Taylor Melendez, ; Feb 8 2016 10:15AM EST                       (Author)    Electronically signed by : AGNIESZKA Villarreal; Feb 8 2016 10:27AM EST                       (Author)    Electronically signed by : David Guerrero DO; Feb 8 2016 11:44AM EST

## 2018-01-10 NOTE — PROGRESS NOTES
2017         RE: Kassie Huang                                To: [facility] [address]  [place]   MR#: [id number]                                    [address]   : [place], [address]: [id number]                             Fax: [phone]   (Exam #: [id number])      The LMP of this 28year old,  G7, P0-3-3-3 patient was 2017, giving   her an HEMA of OCT 12 2017 and a current gestational age of 25 weeks 0 days   by dates  A sonographic examination was performed on 2017 using real   time equipment  The ultrasound examination was performed using abdominal &   vaginal techniques  Earliest ultrasound found in her record: 3/9/17  9w0d  10/12/17 HEMA            [facility] reports occasional pelvic pressure, not accompanied by vaginal   bleeding, leakage of amniotic fluid from the vagina, or mucoid discharge,   or other abdominal discomfort  She declined treatment with weekly 17-P   earlier in the pregnancy  Cardiac motion was observed at 142 bpm       INDICATIONS      previous  delivery   missed anatomy follow up      Exam Types      Transvaginal   Level I      RESULTS      Fetus # 1 of 1   Breech presentation   Placenta Location = Anterior   No placenta previa   Placenta Grade = I      AMNIOTIC FLUID         Largest Vertical Pocket = 5 5 cm   Amniotic Fluid: Normal      CERVICAL EVALUATION      The cervix appeared normal (Ultrasound Examination)  SUPINE      Cervical Length: 3 30 cm      OTHER TEST RESULTS           Funneling?: No             Dynamic Changes?: No        Resp  To TFP?: No                      Debris?: No      ANATOMY DETAILS      Visualized Appearing Sonographically Normal:   HEART: (Four Chamber View, 3 Vessel Trachea, Short Axis of Greater   Vessels, Ductal Arch, Aortic Arch, Interventricular Septum, Interatrial   Septum);     SPINE: (Cervical Spine, Thoracic Spine, Lumbar Spine, Sacrum)      ANATOMY COMMENTS      The prior fetal anatomic survey was limited with respect to evaluation of   the spine, cardiac short axis view the great vessels, cardiac 3 vessel   tracheal, aortic arch, septal, and caval views  These anatomic views were   seen today as sonographically normal within the inherent limitations of   fetal ultrasound      IMPRESSION      Thompson IUP   Breech presentation   Regular fetal heart rate of 142 bpm   Anterior placenta   No placenta previa      GENERAL COMMENT      Detailed evaluation of fetal growth and anatomy was not performed at the   visit today  Instead, attention was drawn toward assessment of anatomic   targets not imaged well on the level II ultrasound study  Fetal interval   growth and amniotic fluid volume are normal       The cervix is normal in appearance by transvaginal sonography  The   cervical length is normal   Cervical debris is not present  Cervical   funneling is not present  Neither provocative nor dynamic change is   appreciated  Today's ultrasound findings and suggested follow-up were discussed in   detail with [facility]  She will return to the [address] [place] [address] in 2 weeks   for cervical sonography  Reassessment of fetal interval growth will be   performed at about 28 weeks gestation  The face to face time, in addition to time spent discussing ultrasound   results, was approximately 10 minutes, greater than 50% of which was spent   during counseling and coordination of care  JENNA Lobo S , JENNA CORADO S  LILIAM Wall     Maternal-Fetal Medicine   Electronically signed 06/08/17 15:20

## 2018-01-10 NOTE — RESULT NOTES
Message   Pt was called however she did not respond  Please inform her that her recent PAP did show mild abnormalities  These need to tested further most likely with colposcopy (direct visulization of her Cervix with possible biopsy)  This needs to be done by a GYN  Please ask her if she has seen a GYN in the past?     Verified Results  (1) THIN PREP PAP FOLLOW UP WITH IMAGING 26Jan2017 12:00AM tSephy Hunt     Test Name Result Flag Reference   LAB AP CASE REPORT (Report)     Gynecologic Cytology Report            Case: [id number]                  Authorizing Provider: Radha Andrea DO     Collected:      01/26/2017           First Screen:     MILANA Tavarez    Received:      01/30/2017 0262        Pathologist:      Ye Tracy MD                              Specimen:  LIQUID-BASED PAP, DIAGNOSTIC, Endocervical   LAB AP GYN PRIMARY INTERPRETATION      Epithelial cell abnormality  Electronically signed by Ye Tracy MD on 2/3/2017 at 9:47 AM   LAB AP GYN INTERPRETATION      Low grade squamous intraepithelial lesion   LAB AP GYN SPECIMEN ADEQUACY      Satisfactory for evaluation  Endocervical/transformation zone component present  LAB AP GYN ADDITIONAL INFORMATION (Report)     [employer]'s FDA approved ,  and ThinPrep Imaging System are   utilized with strict adherence to the 's instruction manual to   prepare gynecologic and non-gynecologic cytology specimens for the   production of ThinPrep slides as well as for gynecologic ThinPrep imaging  These processes have been validated by our laboratory and/or by the     The Pap test is not a diagnostic procedure and should not be used as the   sole means to detect cervical cancer  It is only a screening procedure to   aid in the detection of cervical cancer and its precursors  Both   false-negative and false-positive results have been experienced   Your   patient's test result should be interpreted in this context together with   the history and clinical findings    - Interpretation performed at [facility], [address]   [address]   LAB AP LMP 1/11/2017

## 2018-01-10 NOTE — PROGRESS NOTES
Chief Complaint  Pt presents today for a hcg, progesterone and t&s  Blood work sent to [facility]  Active Problems    1  Acute pharyngitis, unspecified etiology (462) (J02 9)   2  Acute streptococcal pharyngitis (034 0) (J02 0)   3  Bacterial vaginosis (616 10,041 9) (N76 0,B96 89)   4  Bilateral hip pain (719 45) (M25 551,M25 552)   5  Chronic cervical pain (723 1,338 29) (M54 2,G89 29)   6  Chronic constipation (564 00) (K59 09)   7  Chronic foot pain, unspecified laterality (729 5,338 29) (M79 673,G89 29)   8  Chronic lumbar pain (724 2,338 29) (M54 5,G89 29)   9  Chronic pain of multiple joints (719 49,338 29) (M25 50,G89 29)   10  Depression (311) (F32 9)   11  Depression screening (V79 0) (Z13 89)   12  Diffuse myofascial pain syndrome (729 1) (M79 7)   13  Encounter for gynecological examination with abnormal finding (V72 31) (Z01 411)   14  Encounter for gynecological examination without abnormal finding (V72 31) (Z01 419)   15  Extremity numbness (782 0) (R20 0)   16  Hyperreflexia (796 1) (R29 2)   17  Pain syndrome, chronic (338 4) (G89 4)   18  Panic anxiety syndrome (300 01) (F41 0)   19  Positive urine pregnancy test (V72 42) (Z32 01)   20  Possible exposure to STD (V01 6) (Z20 2)   21  Pregnancy (V22 2) (Z33 1)   22  PTSD (post-traumatic stress disorder) (309 81) (F43 10)   23  Reactive airway disease (493 90) (J45 909)   24  Right shoulder pain (719 41) (M25 511)   25  Seizure disorder (345 90) (G40 909)   26  Sensation of foreign body in throat (784 99) (R09 89)   27  Vaginal discharge (623 5) (N89 8)    Current Meds   1  Cyclobenzaprine HCl - 10 MG Oral Tablet; TAKE 1 TABLET Bedtime PRN muscle   spasms; Therapy: 91LWG5835 to (Evaluate:99Sdg7648)  Requested for: 31JRL4808; Last   Rx:16Nov2016 Ordered   2  Diclofenac Sodium 75 MG Oral Tablet Delayed Release; Take 1 tablet twice daily as   needed for pain  take with food;    Therapy: 85MET6956 to (Evaluate:15Jan2017)  Requested for: 21XJV6609; Last Rx:16Nov2016 Ordered   3  MetroNIDAZOLE 500 MG Oral Tablet; TAKE 1 TABLET TWICE DAILY AS DIRECTED; Therapy: 15PGG9366 to (95 060920)  Requested for: 17BBA7508; Last   Rx:29Jan2017 Ordered   4  Omeprazole 40 MG Oral Capsule Delayed Release; TAKE 1 CAPSULE DAILY BEFORE   DINNER; Therapy: 09CMY6080 to (Evaluate:61Axo5671)  Requested for: 68OSD3725; Last   Rx:16Nov2016 Ordered   5  ProAir RespiClick 357 (90 Base) MCG/ACT Inhalation Aerosol Powder Breath Activated;   INHALE 2 PUFFS Every 4 hours PRN Wheezing or SOB; Therapy: 72TOV3166 to (Last Rx:25Mar2016)  Requested for: 25Mar2016 Ordered    Allergies    1  LaMICtal TABS   2  Vicodin TABS    3  Adhesive Tape    Plan  Positive urine pregnancy test    · (1) HCG QUANT; Status:Active - Retrospective By Protocol Authorization; Requested  for:02Mar2017;    · (1) PROGESTERONE; Status:Active - Retrospective By Protocol Authorization; Requested for:02Mar2017;    · (1) TYPE & SCREEN; Status:Active - Retrospective By Protocol Authorization;  Requested  for:02Mar2017;   the patient pregnant or have they been in the last 90 days? : Yes  the patient been transfused in the last 3 months? : No  number of units for surgical date : 0  of Surgery : [id number]    Future Appointments    Date/Time Provider Specialty Site   03/10/2017 01:00 PM Meredith Kaba DO Obstetrics/Gynecology [facility] [address]     Signatures   Electronically signed by : LILIAM Sams ; Mar  2 2017 11:08AM EST                       (Author)

## 2018-01-10 NOTE — PROGRESS NOTES
2017         RE: Siddharth Rasmussen                                To: [facility]   MR#: [id number]                                    [address]   : [address] [place] [address], [address] [place] [address]: [phone]                             Fax: [phone]   (Exam #: [id number])      The LMP of this 28year old,  G7, P0-3-3-3 patient was 2017, giving   her an HEMA of OCT 12 2017 and a current gestational age of 23 weeks 0 days   by dates  A sonographic examination was performed on 2017 using   real time equipment  The ultrasound examination was performed using   abdominal & vaginal techniques  The patient has a BMI of 31 7  Her blood   pressure today was 126/87  Earliest ultrasound found in her record: 3/9/17  9w0d  10/12/17 HEMA      Cardiac motion was observed at 142 bpm       INDICATIONS      previous  delivery      Exam Types      Transvaginal      RESULTS      Fetus # 1 of 1   Breech presentation   Placenta Location = Anterior   No placenta previa   Placenta Grade = I      AMNIOTIC FLUID      Q1: 2 8      Q2: 4 2      Q3: 2 2      Q4: 3 4   WENDY Total = 12 6 cm   Amniotic Fluid: Normal      CERVICAL EVALUATION      The cervix appeared normal (Ultrasound Examination)  SUPINE      Cervical Length: 3 90 cm      OTHER TEST RESULTS           Funneling?: No             Dynamic Changes?: No        Resp  To TFP?: No      IMPRESSION      Thompson IUP   Breech presentation   Regular fetal heart rate of 142 bpm   Anterior placenta   No placenta previa      GENERAL COMMENT      On exam, the patient appears well, in no acute distress, and her abdomen   is nontender  A transvaginal ultrasound was performed to assess the cervix, which was   not seen well transabdominally  The cervical length was 3 9 centimeters,   which is normal for the current gestational age    There was no significant   funneling or dynamic changes appreciated  Thank you very much for allowing us to participate in the care of this   very nice patient  Should you have any questions, please do not hesitate   to contact our office  JENNA Lobo , LILIAM Scanlon     Electronically signed 06/22/17 16:00

## 2018-01-11 NOTE — PROCEDURES
Procedures by Ward Betancourt MD at  6/10/2016  2:56 PM      Author:  Ward Betancourt MD Service:  Neurology Author Type:  Physician     Filed:  6/10/2016  3:13 PM Date of Service:  6/10/2016  2:56 PM Status:  Signed     :  Ward Betancourt MD (Physician)            [facility] ELECTROENCEPHALOGRAM    Patient Name:  Pascale Campos  MRN: [id number]   :  1985 File #: [id number]   Age: 32 y o  Encounter #: [id number]   Date performed: -06/10/2016 Referring Provider: Dr Merrick Suarez          Report date: 6/10/2016          Study type: ambulatory EEG    ICD 10 diagnosis: Other Epilepsy G40 909 and Transient alteration of awareness R40 4    Day 1 of 24 hours ambulatory study  Patient History:  EEG is requested to assess for seizures and/or classification of epilepsy  Patient is 32 y o  female with a history of seizures but no recent seizures  She has episodes of peripheral weakness and periods of confusion  She is not currently  on medications for seizures  Current AEDs:  Medications include:     Description of Procedure: The EEG was performed with electrodes applied using the International 10-20 System  Additional electrodes used included EOG, ECG and T1/T2 electrodes  A single lead ECG channel is also  present  At least 16 channels are reviewed at a time  and formatted into longitudinal bipolar, transverse bipolar, and referential (to common reference or calculated common reference) montages  The EEG was recorded  with the patient awake, drowsy, and asleep  The recording was technically satisfactory  EEG was recorded from 2016 13:18 to 06/10/2016 12:35  Findings:   Background Activity: The background is symmetric with respect to voltages and activity  During wakefulness, the background is well-organized  with anterior low amplitude beta activity and low-moderate amplitude posterior alpha activity    There is a symmetric 11-11 5  Hz posterior dominant rhythm that attenuates with eye opening  Drowsiness is characterized by attenuation of the alpha rhythm, prominence of anterior beta, central theta activity, presence of vertex waves, and positive occipital sharp transients of sleep (POSTS)  Stage 2 sleep is characterized by symmetric sleep spindles and K-complexes  Slow wave sleep and REM sleep are captured  Other findings: The single lead ECG shows a regular sinus cardiac rhythm  There is a persistent P3 electrode artifact for the latter half of the record  Events: There are no patient push button events  Interpretation: This is a normal one day ambulatory EEG  MD Milton Kendall [facility]  Hiwot BEAULIEU    Fritz 10 2016  3:13PM [facility] Standard Time

## 2018-01-11 NOTE — PROGRESS NOTES
SEP 1 2017         RE: Deepti Willis                                To: [facility] [address]  [facility]   MR#: [id number]                                    [address]   : [address], [address] [place] [address] Street: [id number]                             Fax: [phone]   (Exam #: [id number])      The LMP of this 28year old,  G7, P0-3-3-3 patient was 2017, giving   her an HEMA of OCT 12 2017 and a current gestational age of 29 weeks 1 day   by dates  A sonographic examination was performed on SEP 1 2017 using real   time equipment  The ultrasound examination was performed using abdominal   technique  The patient has a BMI of 33 1  Her blood pressure today was   112/63  Earliest ultrasound found in her record: 3/9/17  9w0d  10/12/17 HEMA      Problem list   1  History of a 28 week six-day  delivery that occurred after   falling  2  History of a 34 and 36 week  birth of singletons that weighed   over 7 pounds  She declines the use of Breese  3   GDM   4   History of a left lower extremity DVT diagnosed on    She   currently is on therapeutic Lovenox 80 mg q12hrs      Cardiac motion was observed at 145 bpm       INDICATIONS      previous  delivery   fetal growth   diabetes, gestational      Exam Types      Level I      RESULTS      Fetus # 1 of 1   Vertex presentation   Fetal growth appeared normal   Placenta Location = Anterior   No placenta previa   Placenta Grade = II      MEASUREMENTS (* Included In Average GA)      AC              30 7 cm        34 weeks 6 days* (62%)   BPD              8 1 cm        32 weeks 4 days* (21%)   HC              29 6 cm        32 weeks 2 days* (6%)   Femur            6 5 cm        33 weeks 4 days* (45%)      Cerebellum       4 6 cm        35 weeks 6 days      HC/AC           0 96   FL/AC           0 21   FL/BPD          0 81   EFW (Ac/Fl/Hc)  2326 grams - 5 lbs 2 oz (43%)      THE AVERAGE GESTATIONAL AGE is 33 weeks 2 days +/- 18 days  AMNIOTIC FLUID      Q1: 2 5      Q2: 5 9      Q3: 2 0      Q4: 1 7   WENDY Total = 12 2 cm   Amniotic Fluid: Normal      ANATOMY DETAILS      Visualized Appearing Sonographically Normal:   HEAD: (Calvarium, BPD Level, Lateral Ventricles);    TH  CAV  : (Lungs,   Diaphragm);    STOMACH, RIGHT KIDNEY, LEFT KIDNEY, BLADDER, PLACENTA      ANATOMY COMMENTS      Fetal anatomy has been previously documented; no anomalies were identified  IMPRESSION      Thompson IUP   33 weeks and 2 days by this ultrasound  (HEMA=OCT 18 2017)   Vertex presentation   Fetal growth appeared normal   Regular fetal heart rate of 145 bpm   Anterior placenta   No placenta previa      GENERAL COMMENT      Thank you for allowing me to participate in the care of your patient  Ms Fuller was seen today for fetal growth  She recently was diagnosed with   gestational diabetes and a left lower extremity DVT  The patient was informed of the findings and counseled about the   limitations of the exam in detecting all forms of fetal congenital   abnormalities  She denies any vaginal bleeding or uterine cramping/contractions  She does   feel fetal movement  Exam shows she is comfortable and her abdomen is non tender  Follow up recommended:   1  She is set up for her diabetes classes on 9/5 and 9/11  2  If 20% or more FBS are >95 or 2 hr pp are >120 she will call us and   will be started on insulin or a oral hypoglycemic such as metformin or   glyburide  US for growth and fluid are appropriate today  If medications   are required to control her diabetes she will require twice weekly fetal   testing with NST's from 32 weeks on  I would also recommend delivery   around her due date  If she does not require any medications to control   her diabetes then she can start fetal testing at 40 weeks and be delivered   by 42 weeks   I recommend a repeat ultrasound for growth in 4 weeks  I will   schedule this here  The email address for patients to send their list of sugars to our office   is [email or web address] and the patients need to request a non secure   email back  If no follow up message is relayed in 24 hrs from our office   then they can call the office any day from 8-5:30    3  Currently she is on therapeutic lovenox q12 hrs  She can be converted   to Heparin at 36 weeks but this would include a need for frequent mid   interval ptts every 3 days till her dose is titrated up to 1 5-2 5 her   normal ptt  Other option is to continue her Lovenox and stop it 24 hrs   prior to her induction  She would need to resume then at 6 hrs pp after a   vaginal delivery or 12 hr after a CS  Her first dose if she gets an   epidural should be 40 mg or a prophylactic dose and then can increase back   to 80 mg 24 hrs after the epidural is removed  If she would agree to   conversion to heparin, recommend 42328 u q 12 hrs with a midinterval ptt   scheduled for 3 days after her am dose and then if too low would increase   by another 3000 u twice at day with a repeat midinterval PTT at 6 hrs   after her am dose  I would be happy to assist with conversion to heparin   if needed  The face to face time, in addition to time spent discussing ultrasound   results, was approximately 25 minutes, greater than 50% of which was spent   during counseling and coordination of care  JENNA Messer M D     Maternal-Fetal Medicine   Electronically signed 09/07/17 09:56

## 2018-01-11 NOTE — MISCELLANEOUS
Message   Recorded as Task   Date: 03/17/2016 10:47 PM, Created By: Mikala Means   Task Name: Follow Up   Assigned To: [facility] [place] [facility],Team   Regarding Patient: Ricardo Monroe, Status: In Progress   Nick [place] - 17 Mar 2016 10:47 PM     TASK CREATED  Please call the patient and advise her that her cervical, lumbar, hip/pelvis x-rays were all normal      I also had a chance to review the MRI report of her lumbar spine from 2015 and that was also normal      At this point I feel that her pain is truly myofascial and neuropathic in nature as we discussed at her OV and that following up with Rheumatology and Neurology as we discussed is still the plan/recommendations  She can f/u with me as scheduled  Nicolasa Jung - 18 Mar 2016 12:48 PM     TASK EDITED  *** FYI ***  s/w pt, advised of above  Confirmed 4/14/16 ov w/ DG  pt verbalized understanding  States she has an appt w/ neuro next week but cannot get in with rheumatology until May  Advised pt, ask to be put on a 'call if sooner' list  will make DG aware and c/b if he has any additional input  Pt aware  Capo Altamirano - 18 Mar 2016 1:29 PM     TASK REPLIED TO: Previously Assigned To Capo Altamirano  aware        Active Problems    1  Bilateral hip pain (719 45) (M25 551,M25 552)   2  Cervical pain (723 1) (M54 2)   3  Chronic lumbar pain (724 2,338 29) (M54 5,G89 29)   4  Chronic pain of multiple joints (719 49,338 29) (M25 50,G89 29)   5  Depression (311) (F32 9)   6  Diffuse myofascial pain syndrome (729 1) (M79 7)   7  Encounter for long-term use of opiate analgesic (V58 69) (Z79 891)   8  Extremity numbness (782 0) (R20 0)   9  Hyperreflexia (796 1) (R29 2)   10  Need for immunization against influenza (V04 81) (Z23)   11  Pain syndrome, chronic (338 4) (G89 4)   12  Seizure disorder (345 90) (G40 909)    Current Meds   1  Diclofenac Sodium 50 MG Oral Tablet Delayed Release; take one tablet by mouth BID;    Therapy: 56HRQ7120 to (Last Rx:46Wbt9179)  Requested for: 05GCO5222 Ordered   2  DrRx Flexeril 10 MG #30 Recorded   3  PredniSONE 1 MG Oral Tablet Recorded    Allergies    1  LaMICtal TABS   2   Vicodin TABS    Signatures   Electronically signed by : Silvina Whiteside, ; Mar 18 2016  2:09PM EST                       (Author)

## 2018-01-12 VITALS
OXYGEN SATURATION: 99 % | BODY MASS INDEX: 32.31 KG/M2 | WEIGHT: 171.13 LBS | TEMPERATURE: 98.9 F | HEIGHT: 61 IN | DIASTOLIC BLOOD PRESSURE: 74 MMHG | SYSTOLIC BLOOD PRESSURE: 122 MMHG | HEART RATE: 95 BPM | RESPIRATION RATE: 17 BRPM

## 2018-01-12 VITALS
OXYGEN SATURATION: 98 % | RESPIRATION RATE: 17 BRPM | BODY MASS INDEX: 32.15 KG/M2 | SYSTOLIC BLOOD PRESSURE: 122 MMHG | DIASTOLIC BLOOD PRESSURE: 64 MMHG | WEIGHT: 170.31 LBS | HEIGHT: 61 IN | HEART RATE: 91 BPM | TEMPERATURE: 97.9 F

## 2018-01-12 VITALS
HEIGHT: 63 IN | WEIGHT: 142 LBS | BODY MASS INDEX: 25.16 KG/M2 | TEMPERATURE: 97.1 F | SYSTOLIC BLOOD PRESSURE: 110 MMHG | OXYGEN SATURATION: 98 % | HEART RATE: 92 BPM | DIASTOLIC BLOOD PRESSURE: 72 MMHG | RESPIRATION RATE: 16 BRPM

## 2018-01-12 NOTE — MISCELLANEOUS
Assessment    1  Rib pain on right side (786 50) (R07 81)   2  DVT (deep vein thrombosis) in pregnancy (671 30) (O22 30,I82 409)   3  Gestational diabetes mellitus, currently pregnant (648 83) (O24 419)    Plan  Influenza vaccine needed    · Stop: Fluzone Quadrivalent Intramuscular Suspension   For: Influenza vaccine needed; Ordered By:Chris Alvarez; Effective Date:12Oct2017; Last Updated By: Ariel Ellis; 10/12/2017 3:50:29 PM  Mild asthma    · Ventolin  (90 Base) MCG/ACT Inhalation Aerosol Solution; INHALE 2  PUFFS BY MOUTH EVERY 4 TO 6 HOURS  SPACE 60 SECONDS APART   Rx By: Elizabeht Ramsey; Dispense: 0 Days ; #:1 GM; Refill: 4; For: Mild asthma; ESTELA = N; Verified Transmission to [id number] Last Updated By: System, [employer]; 10/12/2017 12:16:38 PM  Rib pain on right side    · Oxycodone-Acetaminophen 5-325 MG Oral Tablet; TAKE 1 TABLET Every 8 hours  PRN   Rx By: Elizabeth Ramsey; Dispense: 14 Days ; #:42 Tablet; Refill: 0; For: Rib pain on right side; ESTELA = N; Print Rx   · XR RIBS 2 VIEW RIGHT; Status:Active; Requested for:10Oct2017;    Perform:[facility]; Due:10Oct2018; Ordered; For:Rib pain on right side; Ordered By:Chris Alvarez;    Discussion/Summary  Discussion Summary:   Patient is a 35-year-old female  1  Right rib strain-patient's symptoms today  Likely secondary to acute musculoskeletal strain  Recent rib x-ray appeared to show no abnormalities  Continue with supportive care  She was advised on the importance of maintaining proper inspiratory volumes  She was given a prescription for Percocet for severe pain  She was advised to avoid all other medications she was also advised that the symptoms may last approximately 6-8 weeks  2  Left lower extremity DVT -found this past August  Patient currently on treatment with Lovenox  She has been monitored by her obgyn  She will likely need to continue anticoagulation for a total of 6 months    3  Gestational diabetes -appears diet controlled  At this time, consider checking routine blood work including A1c at 6 months to 1 year postpartum  Follow up in 1 month  History of Present Illness  TCM Communication [facility]: The patient is being contacted for follow-up after hospitalization and RICHIE visit scheduled for 10/12/17 with Susie Downing  [facility]  The date of admission: 10/05/17, date of discharge: 10/08/17  Diagnosis: 1  Term Pregnancy 2  Induced Labor 3  Single Fetus 4  A1 Gestational DM 5  GBS positive 6  Hx LLE DVT during pregnancy  She was discharged to home  Medications reviewed and updated today  She scheduled a follow up appointment  Follow-up appointments with other specialists: 1  OB/GYN  The patient is currently asymptomatic  The patient is currently experiencing symptoms  Moderate right-sided rib pain  Referrals Needed:  None  Communication performed and completed by Nicholes Halsted      Review of Systems  Complete-Female:   Constitutional: not feeling poorly and not feeling tired  Eyes: no eyesight problems and no purulent discharge from the eyes  ENT: no nosebleeds and no nasal discharge  Cardiovascular: no chest pain and no palpitations  Respiratory: no cough and no shortness of breath during exertion  Gastrointestinal: no nausea and no diarrhea  Genitourinary: no pelvic pain and no dysmenorrhea  Musculoskeletal: arthralgias and myalgias, but no joint swelling  Integumentary: no rashes and no skin lesions  Neurological: no headache, no numbness and no dizziness  Psychiatric: no anxiety and no depression  Endocrine: no muscle weakness and no deepening of the voice  Hematologic/Lymphatic: no tendency for easy bleeding and no tendency for easy bruising  Active Problems    1  Chronic constipation (564 00) (K59 09)   2  Chronic lumbar pain (724 2,338 29) (M54 5,G89 29)   3  Chronic osteoarthritis (715 90) (M19 90)   4  Depression (311) (F32 9)   5   Diffuse myofascial pain syndrome (729 1) (M79 7)   6  DVT (deep vein thrombosis) in pregnancy (671 30) (O22 30,I82 409)   7  Eczema (692 9) (L30 9)   8  Encounter for pregnancy related examination in third trimester (V22 1) (Z34 93)   9  Extremity numbness (782 0) (R20 0)   10  Gestational diabetes mellitus, currently pregnant (648 83) (O24 419)   11  H/O  delivery, currently pregnant (V23 41) (O09 219)   12  Heart burn (787 1) (R12)   13  Hyperreflexia (796 1) (R29 2)   14  Low grade squamous intraepithelial lesion (LGSIL) on cervical Pap smear (795 03)    (R87 612)   15  Mild asthma (493 90) (J45 998)   16  Need for immunization against influenza (V04 81) (Z23)   17  Pain syndrome, chronic (338 4) (G89 4)   18  Panic anxiety syndrome (300 01) (F41 0)   19  Pregnancy (V22 2) (Z34 90)   20  PTSD (post-traumatic stress disorder) (309 81) (F43 10)   21  Reactive airway disease (493 90) (J45 909)   22  Rib pain on right side (786 50) (R07 81)   23  Seizure disorder (345 90) (G40 909)    Past Medical History    1  History of Bacterial vaginosis (616 10,041 9) (N76 0,B96 89)   2  History of Encounter for long-term use of opiate analgesic (V58 69) (Z79 891)   3  History of allergic rhinitis (V12 69) (Z87 09)   4  History of anxiety disorder (V11 8) (Z86 59)   5  History of bleeding disorder (V12 3) (Z86 2)   6  History of depression (V11 8) (Z86 59)    Surgical History    1  History of Treatment Of Lower Leg Fracture  Surgical History Reviewed: The surgical history was reviewed and updated today  Family History  Father    1  Family history of Bipolar disorder  Paternal Aunt    2  Family history of Seizures  Family History    3  Family history of Back disorder   4  Family history of diabetes mellitus (V18 0) (Z83 3)   5  Family history of heart disease (V17 49) (Z82 49)   6  Family history of hypertension (V17 49) (Z82 49)   7  Family history of malignant neoplasm (V16 9) (Z80 9)   8   Family history of neuropathy (V17 2) (Z82 0)   9  Family history of thyroid disease (V18 19) (Z83 49)  Family History Reviewed: The family history was reviewed and updated today  Social History    · Current every day smoker (305 1) (F17 200)   · Former smoker (U76 79) (W64 069)   · No alcohol use  Social History Reviewed: The social history was reviewed and updated today  The social history was reviewed and is unchanged  Current Meds   1  Enoxaparin Sodium 80 MG/0 8ML Subcutaneous Solution; INJECT 0 8 ML Twice daily; Therapy: 90TAW9189 to (Evaluate:00Xtm2413)  Requested for: 96KJZ9246; Last   Rx:37Tpc2116 Ordered   2  Famotidine 20 MG Oral Tablet; TAKE 1 TABLET TWICE DAILY; Therapy: 25GVV7306 to (Evaluate:48Hvd8578)  Requested for: 12Jun2017; Last   Rx:12Jun2017 Ordered   3  OneTouch Delica Lancets Fine Miscellaneous; TEST 4 TIMES DAILY; Therapy: 12HSN7674 to (MAXFMFWS:57SOQ6002)  Requested for: 05Sep2017; Last   Rx:27Qvu3712 Ordered   4  Prena1 CHEW;   Therapy: ([place]) to Recorded   5  Ventolin  (90 Base) MCG/ACT Inhalation Aerosol Solution; INHALE 2 PUFFS BY   MOUTH EVERY 4 TO 6 HOURS  SPACE 60 SECONDS APART; Therapy: 24Lau5522 to (Last Rx:13Apr2017)  Requested for: 13Apr2017 Ordered  Medication List Reviewed: The medication list was reviewed and updated today  Allergies    1  LaMICtal TABS   2  Vicodin TABS    3  Adhesive Tape   4  FRUIT   5  Other   6  Seasonal   7  VEGETABLES    Vitals  Signs   Recorded: 72RGF0617 12:05PM   Temperature: 98 9 F, Tympanic  Heart Rate: 95  Pulse Quality: Normal  Respiration Quality: Normal  Respiration: 17  Systolic: 194, LUE, Sitting  Diastolic: 74, LUE, Sitting  Height: 5 ft 1 in  Weight: 171 lb 2 oz  BMI Calculated: 32 33  BSA Calculated: 1 77  O2 Saturation: 99, RA  LMP: 07ZIO8110  Pain Scale: 8    Physical Exam    Constitutional   General appearance: No acute distress, well appearing and well nourished      Eyes   Conjunctiva and lids: No swelling, erythema or discharge  Pupils and irises: Equal, round and reactive to light  Ears, Nose, Mouth, and Throat   External inspection of ears and nose: Normal     Nasal mucosa, septum, and turbinates: Normal without edema or erythema  Oropharynx: Normal with no erythema, edema, exudate or lesions  Pulmonary   Respiratory effort: No increased work of breathing or signs of respiratory distress  Auscultation of lungs: Clear to auscultation  Cardiovascular   Auscultation of heart: Normal rate and rhythm, normal S1 and S2, without murmurs  Examination of extremities for edema and/or varicosities: Normal     Abdomen   Abdomen: Non-tender, no masses  Liver and spleen: No hepatomegaly or splenomegaly  Musculoskeletal   Gait and station: Normal     Inspection/palpation of joints, bones, and muscles: Abnormal   Severe right-sided rib pain of her hips 4 through 8  Skin   Skin and subcutaneous tissue: Normal without rashes or lesions  Neurologic   Cranial nerves: Cranial nerves 2-12 intact  Sensation: No sensory loss  Psychiatric   Orientation to person, place, and time: Normal     Mood and affect: Normal          Results/Data  * XR RIBS RIGHT W PA CHEST MIN 3 VIEWS 11Oct2017 03:04PM [facility] Order Number: [id number]     Test Name Result Flag Reference   XR RIBS RIGHT W PA CHEST MIN 3 VIEWS (Report)     RIGHT RIBS AND CHEST - DUAL ENERGY     INDICATION: Right chest wall pain  Coughing  COMPARISON: None     VIEWS: PA chest (including soft tissue/bone algorithms) and 3 views right hemithorax     IMAGES: 7     FINDINGS:     The cardiomediastinal silhouette is unremarkable  Lungs are clear  No pleural effusions  There is no pneumothorax  No rib fractures are identified  IMPRESSION:     1  No active pulmonary disease  2  No evidence of rib fractures         Workstation performed: [id number]     Signed by:   Luz Barrera MD   10/12/17       Future Appointments    Date/Time Provider Specialty Site   11/10/2017 04:00 PM Elizabeth Ramsey DO Family Medicine [address]   Electronically signed by : Shelley Dean, ; Oct 11 2017  8:39AM EST                       (Author)    Electronically signed by : Shelley Dean, ; Oct 11 2017 10:00AM EST                       (Author)    Electronically signed by :  Demetris Watters DO; Oct 12 2017  4:43PM EST                       (Author)

## 2018-01-13 VITALS
WEIGHT: 164 LBS | SYSTOLIC BLOOD PRESSURE: 104 MMHG | HEIGHT: 61 IN | DIASTOLIC BLOOD PRESSURE: 71 MMHG | BODY MASS INDEX: 30.96 KG/M2

## 2018-01-13 VITALS
HEIGHT: 61 IN | WEIGHT: 171 LBS | BODY MASS INDEX: 32.28 KG/M2 | DIASTOLIC BLOOD PRESSURE: 73 MMHG | SYSTOLIC BLOOD PRESSURE: 111 MMHG

## 2018-01-13 VITALS
WEIGHT: 178.04 LBS | DIASTOLIC BLOOD PRESSURE: 69 MMHG | SYSTOLIC BLOOD PRESSURE: 111 MMHG | HEIGHT: 61 IN | BODY MASS INDEX: 33.61 KG/M2

## 2018-01-13 VITALS
SYSTOLIC BLOOD PRESSURE: 109 MMHG | BODY MASS INDEX: 29.27 KG/M2 | DIASTOLIC BLOOD PRESSURE: 69 MMHG | WEIGHT: 155 LBS | HEIGHT: 61 IN

## 2018-01-13 VITALS
SYSTOLIC BLOOD PRESSURE: 121 MMHG | HEIGHT: 61 IN | WEIGHT: 153.2 LBS | DIASTOLIC BLOOD PRESSURE: 77 MMHG | BODY MASS INDEX: 28.92 KG/M2

## 2018-01-13 VITALS
DIASTOLIC BLOOD PRESSURE: 80 MMHG | WEIGHT: 162 LBS | SYSTOLIC BLOOD PRESSURE: 114 MMHG | HEIGHT: 61 IN | BODY MASS INDEX: 30.58 KG/M2

## 2018-01-13 VITALS
DIASTOLIC BLOOD PRESSURE: 77 MMHG | HEIGHT: 61 IN | SYSTOLIC BLOOD PRESSURE: 123 MMHG | BODY MASS INDEX: 30.58 KG/M2 | WEIGHT: 162 LBS

## 2018-01-13 NOTE — MISCELLANEOUS
Message   Recorded as Task   Date: 07/21/2016 11:56 AM, Created By: Neil Barthel   Task Name: Miscellaneous   Assigned To: Neil Barthel   Regarding Patient: Eyal Miller, Status: Active   [facility] - 21 Jul 2016 11:56 AM     TASK CREATED  Pt was a no show for appt today  I LMOM for her to c/b to r/s   Dago Rodriguez - 21 Jul 2016 12:33 PM     TASK REPLIED TO: Previously Assigned To Dago Rodriguez  Provider aware  Thank you  Active Problems    1  Acute pharyngitis, unspecified etiology (462) (J02 9)   2  Acute streptococcal pharyngitis (034 0) (J02 0)   3  Bilateral hip pain (719 45) (M25 551,M25 552)   4  Chronic cervical pain (723 1,338 29) (M54 2,G89 29)   5  Chronic lumbar pain (724 2,338 29) (M54 5,G89 29)   6  Chronic pain of multiple joints (719 49,338 29) (M25 50,G89 29)   7  Depression (311) (F32 9)   8  Diffuse myofascial pain syndrome (729 1) (M79 7)   9  Extremity numbness (782 0) (R20 0)   10  Hyperreflexia (796 1) (R29 2)   11  Pain syndrome, chronic (338 4) (G89 4)   12  Panic anxiety syndrome (300 01) (F41 0)   13  PTSD (post-traumatic stress disorder) (309 81) (F43 10)   14  Reactive airway disease (493 90) (J45 909)   15  Seizure disorder (345 90) (G40 909)    Current Meds   1  Amoxicillin 500 MG Oral Capsule; TAKE 2 CAPSULES TWICE DAILY with food; Therapy: 16ZPF9580 to (Complete:71Lpj8405)  Requested for: 07UNI8001; Last   Rx:15Mva9092 Ordered   2  Celecoxib 200 MG Oral Capsule (CeleBREX); TAKE 1 CAPSULE TWICE DAILY WITH   FOOD; Therapy: 09Ypu0721 to (Evaluate:74Bba4511)  Requested for: 89YYT5227; Last   Rx:80Xqu8278 Ordered   3  Cyclobenzaprine HCl - 10 MG Oral Tablet; TAKE 1 TABLET Bedtime PRN muscle   spasms; Therapy: 64JWB6809 to (Evaluate:96Ksj9608)  Requested for: 50SYS6160; Last   Rx:58Axu7260 Ordered   4  Diclofenac Potassium TABS; Therapy: (Recorded:80Xwp4705) to Recorded   5  Nortriptyline HCl - 25 MG Oral Capsule;  Take 1 pill every other night x 4 nights, then 1 pill   every night x 2 weeks, then 1 pill BID  Call with side effects or issues; Therapy: 97HHE3865 to (Evaluate:25Jun2016)  Requested for: 50DKX0384; Last   Rx:26May2016 Ordered   6  ProAir RespiClick 107 (90 Base) MCG/ACT Inhalation Aerosol Powder Breath Activated;   INHALE 2 PUFFS Every 4 hours PRN Wheezing or SOB; Therapy: 39PUH0671 to (Last Rx:25Mar2016)  Requested for: 25Mar2016 Ordered    Allergies    1  LaMICtal TABS   2  Vicodin TABS    Signatures   Electronically signed by :  Bayron Grant, ; Jul 21 2016  1:08PM EST                       (Author)

## 2018-01-13 NOTE — CONSULTS
I had the pleasure of evaluating your patient, Gabino Arguelles  My full evaluation follows:      Chief Complaint  Here for ultrasound study      History of Present Illness  Please refer to the ultrasound report for additional information  Active Problems    1  Chronic constipation (564 00) (K59 09)   2  Chronic lumbar pain (724 2,338 29) (M54 5,G89 29)   3  Chronic osteoarthritis (715 90) (M19 90)   4  Depression (311) (F32 9)   5  Diffuse myofascial pain syndrome (729 1) (M79 7)   6  DVT (deep vein thrombosis) in pregnancy (671 30) (O22 30,I82 409)   7  Eczema (692 9) (L30 9)   8  Encounter for pregnancy related examination in third trimester (V22 1) (Z34 93)   9  Extremity numbness (782 0) (R20 0)   10  Gestational diabetes mellitus, currently pregnant (648 83) (O24 419)   11  H/O  delivery, currently pregnant (V23 41) (O09 219)   12  Heart burn (787 1) (R12)   13  Hyperreflexia (796 1) (R29 2)   14  Low grade squamous intraepithelial lesion (LGSIL) on cervical Pap smear (795 03)    (R87 612)   15  Mild asthma (493 90) (J45 998)   16  Need for immunization against influenza (V04 81) (Z23)   17  Pain syndrome, chronic (338 4) (G89 4)   18  Panic anxiety syndrome (300 01) (F41 0)   19  Pregnancy (V22 2) (Z34 90)   20  PTSD (post-traumatic stress disorder) (309 81) (F43 10)   21  Reactive airway disease (493 90) (J45 909)   22   Seizure disorder (345 90) (G40 909)    Past Medical History    · History of Bacterial vaginosis (616 10,041 9) (N76 0,B96 89)   · History of Encounter for long-term use of opiate analgesic (V58 69) (R46 643)   · History of allergic rhinitis (V12 69) (Z87 09)   · History of anxiety disorder (V11 8) (Z86 59)   · History of bleeding disorder (V12 3) (Z86 2)   · History of depression (V11 8) (Z86 59)    Surgical History    · History of Treatment Of Lower Leg Fracture    Family History    · Family history of Bipolar disorder    · Family history of Seizures    · Family history of Back disorder   · Family history of diabetes mellitus (V18 0) (Z83 3)   · Family history of heart disease (V17 49) (Z82 49)   · Family history of hypertension (V17 49) (Z82 49)   · Family history of malignant neoplasm (V16 9) (Z80 9)   · Family history of neuropathy (V17 2) (Z82 0)   · Family history of thyroid disease (V18 19) (Z83 49)    Social History    · Current every day smoker (305 1) (F17 200)   · Former smoker (V15 82) (P58 577)   · No alcohol use    Current Meds   1  Enoxaparin Sodium 80 MG/0 8ML Subcutaneous Solution; INJECT 0 8 ML Twice daily; Therapy: 49AQI0067 to (Evaluate:32Pfc5588)  Requested for: 85TSB0282; Last   Rx:64Qid9855 Ordered   2  Famotidine 20 MG Oral Tablet; TAKE 1 TABLET TWICE DAILY; Therapy: 15FZJ9516 to (Evaluate:29Wxc0255)  Requested for: 2017; Last   Rx:2017 Ordered   3  OneTouch Delica Lancets Fine Miscellaneous; TEST 4 TIMES DAILY; Therapy: 79QDG2468 to (TIXNQX51CKW2388)  Requested for: 79Vzd3491; Last   Rx:72Fyi1901 Ordered   4  Prena1 CHEW;   Therapy: (Wendy Negro) to Recorded   5  ProAir RespiClick 691 (90 Base) MCG/ACT Inhalation Aerosol Powder Breath Activated;   INHALE 2 PUFFS Every 4 hours PRN Wheezing or SOB; Therapy: 04WCM2474 to (Last Rx:2016)  Requested for: 2016 Ordered   6  Ventolin  (90 Base) MCG/ACT Inhalation Aerosol Solution; INHALE 2 PUFFS BY   MOUTH EVERY 4 TO 6 HOURS  SPACE 60 SECONDS APART; Therapy: 2017 to (Last Rx:2017)  Requested for: 2017 Ordered    Allergies    1  LaMICtal TABS   2  Vicodin TABS    3  Adhesive Tape   4  FRUIT   5  Other   6  Seasonal   7  VEGETABLES    Vitals   Recorded: 54JFL9899 32:48GF   Systolic 741, LUE, Sitting   Diastolic 69, LUE, Sitting   Height 5 ft 1 in   Weight 178 lb 0 6 oz   BMI Calculated 33 64   BSA Calculated 1 8   Pain Scale 6     Results/Data  Exam description: level I obstetrical ultrasound   Findings: Please refer to the ultrasound report for additional information  Discussion/Summary    Please refer to the ultrasound report for additional information  The patient was counseled regarding diagnostic results, instructions for management, prognosis, impressions  Thank you very much for allowing me to participate in the care of this patient  If you have any questions, please do not hesitate to contact me        Signatures   Electronically signed by : LILIAM Santoyo ; Sep 29 2017  7:01PM EST                       (Author)

## 2018-01-14 VITALS
HEIGHT: 62 IN | DIASTOLIC BLOOD PRESSURE: 82 MMHG | RESPIRATION RATE: 16 BRPM | BODY MASS INDEX: 29.15 KG/M2 | SYSTOLIC BLOOD PRESSURE: 126 MMHG | TEMPERATURE: 99.1 F | OXYGEN SATURATION: 98 % | WEIGHT: 158.38 LBS | HEART RATE: 106 BPM

## 2018-01-14 VITALS
DIASTOLIC BLOOD PRESSURE: 70 MMHG | RESPIRATION RATE: 16 BRPM | BODY MASS INDEX: 24.84 KG/M2 | SYSTOLIC BLOOD PRESSURE: 110 MMHG | OXYGEN SATURATION: 99 % | WEIGHT: 140.19 LBS | TEMPERATURE: 99.3 F | HEIGHT: 63 IN | HEART RATE: 100 BPM

## 2018-01-14 VITALS
WEIGHT: 175.5 LBS | BODY MASS INDEX: 33.14 KG/M2 | SYSTOLIC BLOOD PRESSURE: 112 MMHG | DIASTOLIC BLOOD PRESSURE: 63 MMHG | HEIGHT: 61 IN

## 2018-01-14 VITALS
HEIGHT: 61 IN | OXYGEN SATURATION: 98 % | DIASTOLIC BLOOD PRESSURE: 78 MMHG | HEART RATE: 90 BPM | SYSTOLIC BLOOD PRESSURE: 110 MMHG | WEIGHT: 165.25 LBS | BODY MASS INDEX: 31.2 KG/M2 | RESPIRATION RATE: 16 BRPM | TEMPERATURE: 98.1 F

## 2018-01-14 VITALS
HEIGHT: 61 IN | BODY MASS INDEX: 31.72 KG/M2 | DIASTOLIC BLOOD PRESSURE: 87 MMHG | SYSTOLIC BLOOD PRESSURE: 126 MMHG | WEIGHT: 168 LBS

## 2018-01-14 NOTE — PROGRESS NOTES
MAY 11 2017         RE: Salima Frost                                To: Ob/Gyn Care   [facility]   MR#: [id number]                                    [address]   :   [address], [address] [place] [address]: [id number]                             Fax: [phone]   (Exam #: [id number])      The LMP of this 32year old,  G7, P0-3-3-3 patient was 2017, giving   her an HEMA of OCT 12 2017 and a current gestational age of 22 weeks 0 days   by dates  A sonographic examination was performed on MAY 11 2017 using   real time equipment  The ultrasound examination was performed using   abdominal & vaginal techniques  The patient has a BMI of 30 6  Her blood   pressure today was 114/80  Earliest ultrasound found in her record: 3/9/17  9w0d  10/12/17 HEMA      Problem list   1  History of a 28 week six-day  delivery of twins that occurred   after falling   2  History of a 34 and 36 week  birth of singletons  She declines   the use of Sylvia      Cardiac motion was observed at 140 bpm       INDICATIONS      prior second trimester pregnancy loss   previous  delivery      Exam Types      Transvaginal      RESULTS      Fetus # 1 of 1   Vertex presentation   Placenta Location = Anterior   No placenta previa   Placenta Grade = I      AMNIOTIC FLUID         Largest Vertical Pocket = 4 0 cm   Amniotic Fluid: Normal      CERVICAL EVALUATION      SUPINE      Cervical Length: 3 60 cm      OTHER TEST RESULTS           Funneling?: No             Dynamic Changes?: No        Resp  To TFP?: No      IMPRESSION      Thompson IUP   Vertex presentation   Regular fetal heart rate of 140 bpm   Anterior placenta   No placenta previa      RECOMMENDATION      Fetal Anatomic Survey: at 20 weeks   Transvaginal cervical length: at 20 weeks      JENNA Faulkner M D     Maternal-Fetal Medicine   Electronically signed 05/11/17 09:31

## 2018-01-15 ENCOUNTER — APPOINTMENT (OUTPATIENT)
Dept: PHYSICAL THERAPY | Facility: REHABILITATION | Age: 33
End: 2018-01-15
Payer: COMMERCIAL

## 2018-01-15 VITALS
BODY MASS INDEX: 28.91 KG/M2 | WEIGHT: 153.13 LBS | OXYGEN SATURATION: 99 % | SYSTOLIC BLOOD PRESSURE: 124 MMHG | DIASTOLIC BLOOD PRESSURE: 80 MMHG | TEMPERATURE: 98.9 F | HEIGHT: 61 IN | HEART RATE: 105 BPM

## 2018-01-15 PROCEDURE — 97140 MANUAL THERAPY 1/> REGIONS: CPT

## 2018-01-15 PROCEDURE — 97110 THERAPEUTIC EXERCISES: CPT

## 2018-01-15 NOTE — RESULT NOTES
Verified Results  (1) GLUCOSE, 1HR PG (50gm Glu Challenge Preg-Pts) [id number] 05:04PM Leatha Mendez     Test Name Result Flag Reference   GLUCOSE, 1 Hr  mg/dL H See Comment   Specimen collection should occur prior to Sulfasalazine administration due to the potential for falsely depressed results  Specimen collection should occur prior to Sulfapyridine administration due to the potential for falsely elevated results  Specimen collection should occur prior to Sulfasalazine administration due to the potential for falsely depressed results  Specimen collection should occur prior to Sulfapyridine administration due to the potential for falsely elevated results  (1) CBC/PLT/DIFF 26CWD4926 05:04PM Neva Mendez     Test Name Result Flag Reference   WBC COUNT 7 17 Thousand/uL  4 31-10 16   RBC COUNT 3 73 Million/uL L 3 81-5 12   HEMOGLOBIN 10 9 g/dL L 11 5-15 4   HEMATOCRIT 32 6 % L 34 8-46  1   MCV 87 fL  82-98   MCH 29 2 pg  26 8-34 3   MCHC 33 4 g/dL  31 4-37 4   RDW 13 7 %  11 6-15 1   MPV 10 4 fL  8 9-12 7   PLATELET COUNT 893 Thousands/uL  149-390   nRBC AUTOMATED 0 /100 WBCs     NEUTROPHILS RELATIVE PERCENT 69 %  43-75   LYMPHOCYTES RELATIVE PERCENT 19 %  14-44   MONOCYTES RELATIVE PERCENT 11 %  4-12   EOSINOPHILS RELATIVE PERCENT 1 %  0-6   BASOPHILS RELATIVE PERCENT 0 %  0-1   NEUTROPHILS ABSOLUTE COUNT 4 86 Thousands/? ??L  1 85-7 62   LYMPHOCYTES ABSOLUTE COUNT 1 38 Thousands/? ??L  0 60-4 47   MONOCYTES ABSOLUTE COUNT 0 82 Thousand/? ??L  0 17-1 22   EOSINOPHILS ABSOLUTE COUNT 0 07 Thousand/? ??L  0 00-0 61   BASOPHILS ABSOLUTE COUNT 0 01 Thousands/? ??L  0 00-0 10   This is a patient instruction: This test is non-fasting  Please drink two glasses of water morning of bloodwork
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
## 2018-01-15 NOTE — PROGRESS NOTES
2017         RE: Jonathan Boston                                To: Ob/Gyn Care   [facility] [address]  [facility]   MR#: [id number]                                    [address] [facility] [address]   : [address] [place] [address], [address] [place] [address] Street: [phone]                             Fax: [phone]   (Exam #: [id number])      The LMP of this 32year old,  G7, P0-3-3-3 patient was 2017, giving   her an HEMA of OCT 12 2017 and a current gestational age of 12 weeks 0 days   by dates  A sonographic examination was performed on 2017 using   real time equipment  The ultrasound examination was performed using   abdominal & vaginal techniques  The patient has a BMI of 29 3  Her blood   pressure today was 109/69  Earliest ultrasound found in her record: 3/9/17  9w0d  10/12/17 HEMA      Cardiac motion was observed at 143 bpm       INDICATIONS      first trimester genetic screening   prior second trimester pregnancy loss   previous  delivery      Exam Types      Transvaginal      RESULTS      Fetus # 1 of 1   Breech presentation   Placenta Location = Anterior   No placenta previa   Placenta Grade = 0      AMNIOTIC FLUID         Largest Vertical Pocket = 3 3 cm   Amniotic Fluid: Normal      CERVICAL EVALUATION      SUPINE      Cervical Length: 4 40 cm      OTHER TEST RESULTS           Funneling?: No             Dynamic Changes?: No        Resp  To TFP?: No      IMPRESSION      Thompson IUP   Breech presentation   Regular fetal heart rate of 143 bpm   Anterior placenta   No placenta previa      RECOMMENDATION      Transvaginal cervical length: 2 Weeks      GENERAL COMMENT      On exam, the patient appears well, in no acute distress, and her abdomen   is nontender  A transvaginal ultrasound was performed to assess the cervix, which was   not seen well transabdominally    The cervical length was 4 4 centimeters,   which is normal for the current gestational age  There was no significant   funneling or dynamic changes appreciated  Thank you very much for allowing us to participate in the care of this   very nice patient  Should you have any questions, please do not hesitate   to contact our office  JENNA Jung M D     Electronically signed 04/27/17 14:08

## 2018-01-15 NOTE — PROGRESS NOTES
2017         RE: Tomasa Rene                                To: Ob/Gyn Care   [facility] [address]  [facility]   MR#: [id number]                                    [address]   : [address] [place] [address] [address]: [phone]                             Fax: [phone]   (Exam #: [id number])      The LMP of this 28year old,  G7, P0-3-3-3 patient was  2017, giving   her an HEMA of OCT 12 2017 and a current gestational age of 35 weeks 0 days   by dates  A sonographic examination was performed on 2017 using   real time equipment  The ultrasound examination was performed using    abdominal & vaginal techniques  The patient has a BMI of 32 3  Her blood   pressure today was 111/73  Earliest ultrasound found in her record: 3/9/17  9w0d  10/12/17 HEMA      Problem list   1  History of a 28 week six-day   delivery that occurred after   falling  2  History of a 34 and 36 week  birth of singletons that weighed   over 7 pounds  She declines the use of [place]  She complained of irregular contractions in the last week  with some   occurring more then 4 times in one hour        Cardiac motion was observed at 145 bpm       INDICATIONS      previous  delivery      Exam Types      Level I   Transvaginal      RESULTS       Fetus # 1 of 1   Vertex presentation   Placenta Location = Anterior   No placenta previa   Placenta Grade = II      MEASUREMENTS (* Included In Average GA)      AC              23 5 cm        27 weeks 6 days* (42%)   BPD               6 6 cm        26 weeks 4 days* (10%)   HC              24 4 cm        26 weeks 2 days*   Femur            5 1 cm        27 weeks 4 days* (30%)      Cerebellum       3 2 cm        28 weeks 4 days      HC/AC           1 04    FL/AC           0 22   FL/BPD          0 78   Ceph Index      0 75   EFW (Ac/Fl/Hc)  1084 grams - 2 lbs 6 oz                 (40%) THE AVERAGE GESTATIONAL AGE is 27 weeks 1 day +/- 14 days  AMNIOTIC FLUID      Q1:  2 4      Q2: 4 5      Q3: 3 6      Q4: 3 4   WENDY Total = 13 9 cm   Amniotic Fluid: Normal      CERVICAL EVALUATION      SUPINE      Cervical Length: 3 90 cm      OTHER TEST RESULTS           Funneling?: No             Dynamic  Changes?: No        Resp  To TFP?: No      ANATOMY DETAILS      Visualized Appearing Sonographically Normal:   HEAD: (Calvarium, BPD Level, Lateral Ventricles, Choroid Plexus,   Cerebellum, Cisterna Magna);    TH  CAV  : (Lungs, Diaphragm); HEART:   ([employer], 3VV, Interventricular Septum, Interatrial Septum,   Cardiac Axis, Cardiac Position);    STOMACH, RIGHT KIDNEY, LEFT KIDNEY,   BLADDER, PLACENTA      ANATOMY COMMENTS      Fetal anatomy has been previously  documented; no anomalies were identified  IMPRESSION      Thompson IUP   27 weeks and 1 day by this ultrasound  (HEMA=OCT 18 2017)   Vertex presentation   Regular fetal heart rate of 145 bpm   Anterior placenta   No placenta  previa      GENERAL COMMENT      She reports her prior 36 week PTD occurred in [facility] in [facility] in   2012, and in the Replaced by [facility] for her 2007 delivery of a 28 weeker   and and her 2010 delivery of a 34 weeker  Her  34 and 36 week PTD seemed   large at over 7lb  Will request records  Will also try to obtain her   records of her 28 week PTD  No further follow-up ultrasound is recommended at this time  Her last OB   visit was June 12  Currently  she does not have any further OB visits   scheduled  She is aware she is supposed to be seeing her OB providers   every 4 weeks and that this is the time that she needs to do her 28 week   labs  Will have her OB office contact Z[facility]rina to set  up her next OB visit  JENNA Tapia M D  Maternal-Fetal Medicine   Electronically signed 07/20/17 18:04           AMENDMENTS:  1   Received  records of 28 week 6 day abruption  She was admitted on 11/25/07 to [facility]  She was transferred for delivery on 12/1/07 after she progressed in labor to [facility] in [place]  The same hospital system  stated there were no records from her 2010 delivery       Electronically signed by:Margarita BEAULIEU MD,error  Aug 22 2017  8:00AM EST

## 2018-01-16 NOTE — RESULT NOTES
Verified Results  (1) GLUCOSE TOLERANCE TEST, 3HR [id number] 08:44AM Manan Mo Order Number: [id number]     Test Name Result Flag Reference   GLUCOSE TOLERANCE FASTING 104 mg/dL H 65-99   Specimen collection should occur prior to Sulfasalazine administration due to the potential for falsely depressed results  Specimen collection should occur prior to Sulfapyridine administration due to the potential for falsely elevated results

## 2018-01-16 NOTE — PROGRESS NOTES
SEP 29 2017         RE: Marlen Greenwood                                To: [facility] [address]  [facility]   MR#: [id number]                                    [address]   : Rakan Mohamud 1778: [phone]                             Fax: [phone]   (Exam #: [id number])      The LMP of this 28year old,  G7, P0-3-3-3 patient was 2017, giving   her an HEMA of OCT 12 2017 and a current gestational age of 37 weeks 1 day   by dates  A sonographic examination was performed on SEP 29 2017 using   real time equipment  The ultrasound examination was performed using   abdominal technique  The patient has a BMI of 33 8  Her blood pressure   today was 111/69  Earliest ultrasound found in her record: 3/9/17  9w0d  10/12/17 HEMA                  Alicia Long has no complaints  She reports regular fetal movements and denies   problems related to vaginal bleeding,  labor, or hypertension  Gestational diabetes remains diet controlled  She is currently treated   with 80 mg of low molecular weight heparin subcutaneously every 12 hours  She is scheduled for labor induction on  apparently  Alicia Long has   not received the influenza vaccine during this pregnancy        Cardiac motion was observed at 128 bpm       INDICATIONS      previous  delivery   diabetes, gestational      Exam Types      Level I      RESULTS      Fetus # 1 of 1   Vertex presentation   Fetal growth appeared normal   Placenta Location = Anterior   No placenta previa   Placenta Grade = II      MEASUREMENTS (* Included In Average GA)      AC              35 5 cm        39 weeks 5 days* (82%)   BPD              8 6 cm        34 weeks 5 days* (<5%)   HC              31 6 cm        35 weeks 0 days* (<5%)   Femur            7 2 cm        36 weeks 3 days* (35%)      HC/AC           0 89   FL/AC           0 20   FL/BPD          0 84   EFW (Ac/Fl/Hc) 3337 grams - 7 lbs 6 oz                 (56%)      THE AVERAGE GESTATIONAL AGE is 36 weeks 4 days +/- 21 days  AMNIOTIC FLUID      Q1: 5 2      Q2: 4 5      Q3: 2 8      Q4: 4 3   WENDY Total = 16 8 cm   Amniotic Fluid: Normal      ANATOMY DETAILS      Visualized Appearing Sonographically Normal:   HEAD: (Calvarium, BPD Level, Lateral Ventricles);    FACE/NECK:   (Nose/Lips);    TH  CAV : (Diaphragm);    STOMACH, RIGHT KIDNEY, LEFT   KIDNEY, BLADDER, PLACENTA      Not Visualized:   HEAD: (Cerebellum); HEART: (Four Chamber View, Proximal Left Outflow,   Proximal Right Outflow, 3VV)      IMPRESSION      Thompson IUP   36 weeks and 4 days by this ultrasound  (HEMA=OCT 23 2017)   Vertex presentation   Fetal growth appeared normal   Regular fetal heart rate of 128 bpm   Anterior placenta   No placenta previa      GENERAL COMMENT      No fetal structural abnormality is identified on the Level I survey today   in a limited study secondary to the constraints related to the late   gestational age  In particular, fetal cardiac anatomy is suboptimally   imaged  Fetal interval growth and amniotic fluid volume are normal       Today's ultrasound findings and suggested follow-up were discussed in   detail with Zabrina  We discussed the importance of receiving the   influenza vaccine during pregnancy  Daily third trimester fetal kick   counting was discussed at the visit today  No further appointment has been   scheduled in the [facility], [facility]  for Eligio Hernández at this time  Follow-up M   ultrasound evaluation is recommended as clinically indicated  Conversion   to unfractionated heparin should be considered, which will afford Eligio Hernández   her best opportunity to receive regional anesthesia during her labor and   delivery course  If Eligio Hernández is not converted to unfractionated heparin,   discontinuation of low molecular weight heparin therapy at least 24 hours   prior to planned induction should be considered    I recommended resumption   of heparin therapy in the postpartum course, given her recent history of   venous thromboembolism  Tina Diez should maintain contact with the Diabetes and Pregnancy program in   the Count includes the [facility], [facility]  at least once a week for review of her blood glucose   values and adjustment of insulin doses  Her history of gestational   diabetes during this pregnancy is associated with an increased risk for   the development of overt, Type II diabetes later in her life  Her risk   for developing Type II diabetes is as high as 70%  A 75 gram, two-hour,   OGTT is suggested as initial follow up 6-12 weeks following delivery  The face to face time, in addition to time spent discussing ultrasound   results, was approximately 10 minutes, greater than 50% of which was spent   during counseling and coordination of care  JENNA Lobo S , R D C S Percell Closs, M D     Maternal-Fetal Medicine   Electronically signed 09/29/17 19:09

## 2018-01-16 NOTE — MISCELLANEOUS
Message   Recorded as Task   Date: 03/17/2016 10:47 PM, Created By: Sonny Hannah   Task Name: Follow Up   Assigned To: [facility] [place] [facility],Team   Regarding Patient: Cecilio Tez, Status: In Progress   CommentFrancedonna Елена - 17 Mar 2016 10:47 PM     TASK CREATED  Please call the patient and advise her that her cervical, lumbar, hip/pelvis x-rays were all normal      I also had a chance to review the MRI report of her lumbar spine from 2015 and that was also normal      At this point I feel that her pain is truly myofascial and neuropathic in nature as we discussed at her OV and that following up with Rheumatology and Neurology as we discussed is still the plan/recommendations  She can f/u with me as scheduled  Nicolasa Jung - 18 Mar 2016 12:48 PM     TASK EDITED  *** FYI ***  s/w pt, advised of above  Confirmed 4/14/16 ov w/ DG  pt verbalized understanding  States she has an appt w/ neuro next week but cannot get in with rheumatology until May  Advised pt, ask to be put on a 'call if sooner' list  will make DG aware and c/b if he has any additional input  Pt aware  Capo Altamirano - 18 Mar 2016 1:29 PM     TASK REPLIED TO: Previously Assigned To Capo Altamirano  aware        Active Problems    1  Bilateral hip pain (719 45) (M25 551,M25 552)   2  Cervical pain (723 1) (M54 2)   3  Chronic lumbar pain (724 2,338 29) (M54 5,G89 29)   4  Chronic pain of multiple joints (719 49,338 29) (M25 50,G89 29)   5  Depression (311) (F32 9)   6  Diffuse myofascial pain syndrome (729 1) (M79 7)   7  Encounter for long-term use of opiate analgesic (V58 69) (Z79 891)   8  Extremity numbness (782 0) (R20 0)   9  Hyperreflexia (796 1) (R29 2)   10  Need for immunization against influenza (V04 81) (Z23)   11  Pain syndrome, chronic (338 4) (G89 4)   12  Seizure disorder (345 90) (G40 909)    Current Meds   1  Diclofenac Sodium 50 MG Oral Tablet Delayed Release; take one tablet by mouth BID;    Therapy: [id number] to (Last Rx:[id number])  Requested for: [id number] Ordered   2  DrRx Flexeril 10 MG #30 Recorded   3  PredniSONE 1 MG Oral Tablet Recorded    Allergies    1  LaMICtal TABS   2   Vicodin TABS    Signatures   Electronically signed by : Marva Moreno, ; Mar 18 2016  2:08PM EST                       (Author)

## 2018-01-16 NOTE — PROCEDURES
Procedures by Srinivas Boogie MD  at 2016 12:35 PM      Author:  Srinivas Boogie MD Service:  Neurology Author Type:  Physician     Filed:  2016 12:41 PM Date of Service:  2016 12:35 PM Status:  Signed     :  Srinivas Boogie MD (Physician)            ELECTROENCEPHALOGRAM (EEG)      Patient Name:  Jonathan Boston  MRN: [id number]   :  1985 File #: [id number]   Age: 32 y o  Encounter #: [id number]   Date performed: 2016           Report date: 2016          Study type: Routine EEG    ICD 10 diagnosis: Other Epilepsy G40 909    Start time: 11:06:42 End time: 11:42:41     -------------------------------------------------------------------------------------------------------------------   Patient History:  Patient states she has a history of seizures but has not had one in several years  She states she has episodes of peripheral weakness and some periods of confusion  She also suffers from fibromyalgia  Dr David Crowe' ordering note states multiple GTC seizures about 7 years ago  Not on medication  One possible seizure in   Classify possible epilepsy, estimate  recurrence risk  Medications include: None    -------------------------------------------------------------------------------------------------------------------   Description of Procedure:  ·  32 channel digital recording with electrodes placed according to the International 10-20 system with additional  T1/T2 electrodes, EOG, EKG, and simultaneous  video  The recording was technically satisfactory  -------------------------------------------------------------------------------------------------------------------   Results:  Background Activity:  The recording was performed with the patient awake, drowsy, and asleep  During wakefulness, there were long runs of well-regulated, mid amplitude, posteriorly  dominant, symmetric 11-12 Hz activity that  did attenuate with eye opening       Drowsiness and light sleep is characterized by attenuation and irregularity of the alpha rhythm, and the development of irregular intermixed theta slowing      -------------------------------------------------------------------------------------------------------------------   Activation Procedures:  Hyperventilation was performed for 3-4  minutes and did not produce any changes  Stepped photic stimulation between 1-20 cps was performed and did not induce  any changes  -------------------------------------------------------------------------------------------------------------------   Abnormal Findings:  No focal abnormalities or interictal epileptiform discharges were noted  No electrographic seizures were observed during this recording     -------------------------------------------------------------------------------------------------------------------  Other Findings: The single lead ECG demonstrated a regular sinus rhythm     -------------------------------------------------------------------------------------------------------------------  Events:  No patient push button events  -------------------------------------------------------------------------------------------------------------------   EEG Interpretation:  Normal 30 minute EEG  Scribe Attestation:  Documented by Nancy Denis, acting as a scribe for Dr Juan Giraldo on 5/27/16 at 12:38 PM     Physician Attestation:  All documentation recorded by the scribe accurately reflects the service I personally performed and the decisions made by me  I was present during the time the encounter was recorded and have reviewed all the documentation and confirm that it is all accurate  and representative of the encounter  Karin Mccord MD   Medical Director  [address] [place] [address] [facility]  Pager # [phone]    May 27 2016 12:41PM [facility] Standard Time

## 2018-01-17 NOTE — PROGRESS NOTES
MAY 25 2017         RE: Marizol Wu                                To: [facility]   MR#: [id number]                                    [address]   : [facility], [address] [place] [address] Street: [phone]:[facility]                             Fax: [phone]   (Exam #: [id number])      The LMP of this 28year old,  G7, P0-3-3-3 patient was 2017, giving   her an HEMA of OCT 12 2017 and a current gestational age of 25 weeks 0 days   by dates  A sonographic examination was performed on MAY 25 2017 using   real time equipment  The ultrasound examination was performed using   abdominal & vaginal techniques  The patient has a BMI of 30 6  Her blood   pressure today was 123/77  Earliest ultrasound found in her record: 3/9/17  9w0d  10/12/17 HEMA      Problem list   1  History of a 28 week six-day  delivery that occurred after   falling  This pregnancy was also complicated by a vanishing twin at 6 weeks   2  History of a 34 and 36 week  birth of singletons that weighed   7lb 13 oz   She declines the use of Priddy      Cardiac motion was observed at 130 bpm       INDICATIONS      previous  delivery   fetal anatomical survey      Exam Types      LEVEL II   Transvaginal      RESULTS      Fetus # 1 of 1   Breech presentation   Fetal growth appeared normal   Placenta Location = Anterior   No placenta previa   Placenta Grade = I      MEASUREMENTS (* Included In Average GA)      AC              14 2 cm        19 weeks 1 day * (36%)   BPD              4 3 cm        19 weeks 1 day * (30%)   HC              16 2 cm        18 weeks 6 days* (17%)   Femur            3 0 cm        19 weeks 4 days* (29%)      Nuchal Fold      4 0 mm   NBL              6 0 mm      Humerus          3 0 cm        19 weeks 6 days  (51%)      Cerebellum       1 9 cm        19 weeks 3 days   Biorbit          2 9 cm        19 weeks 0 days CisternaMagna    5 5 mm      HC/AC           1 15   FL/AC           0 21   FL/BPD          0 70   EFW (Ac/Fl/Hc)   287 grams - 0 lbs 10 oz      THE AVERAGE GESTATIONAL AGE is 19 weeks 1 day +/- 10 days  AMNIOTIC FLUID         Largest Vertical Pocket = 4 0 cm   Amniotic Fluid: Normal      CERVICAL EVALUATION      SUPINE      Cervical Length: 3 80 cm      OTHER TEST RESULTS           Funneling?: No             Dynamic Changes?: No        Resp  To TFP?: No      ANATOMY      Head                                    Normal   Face/Neck                               Normal   Th  Cav  Normal   Heart                                   See Details   Abd  Cav  Normal   Stomach                                 Normal   Right Kidney                            Normal   Left Kidney                             Normal   Bladder                                 Normal   Abd  Wall                               Normal   Spine                                   See Details   Extrems                                 Normal   Genitalia                               Normal   Placenta                                Normal   Umbl  Cord                              Normal   Uterus                                  Normal   PCI                                     Normal      ANATOMY DETAILS      Visualized Appearing Sonographically Normal:   HEAD: (Calvarium, BPD Level, Cavum, Lateral Ventricles, Choroid Plexus,   Cerebellum, Cisterna Magna);    FACE/NECK: (Neck, Nuchal Fold, Profile,   Orbits, Nose/Lips, Palate, Face);    TH  CAV  : (Lungs, Diaphragm); HEART: (Four Chamber View, Proximal Left Outflow, Proximal Right Outflow,   3VV, Ductal Arch, Cardiac Axis, Cardiac Position);    ABD  CAV : (Liver);      STOMACH, RIGHT KIDNEY, LEFT KIDNEY, BLADDER, ABD   WALL, EXTREMS: (Lt   Humerus, Rt Humerus, Lt Forearm, Rt Forearm, Lt Hand, Rt Hand, Lt Femur,   Rt Femur, Lt Low Leg, Rt Low Leg, Lt Foot, Rt Foot);    GENITALIA,   PLACENTA, UMBL  CORD, UTERUS, PCI      Suboptimally Visualized:   SPINE: (Cervical Spine, Thoracic Spine, Lumbar Spine, Sacrum)      Not Visualized:   HEART: (3 Vessel Trachea, Short Axis of Greater Vessels, Aortic Arch,   Interventricular Septum, Interatrial Septum, IVC, SVC)      ANATOMY COMMENTS      Her survey of the fetal anatomy is not complete  No fetal structural abnormality or ultrasound marker for aneuploidy is   identified on the Level II ultrasound study today  The missed or limited   views above are secondary to her skin thickness, fetal position, late   gestational age  Fetal growth and amniotic fluid volume appear normal     Active movement of the fetal body & extremities was seen  There is no   suspicion of a subchorionic bleed  The placental cord insertion was   normal       ADNEXA      The left ovary appeared normal and measured 2 8 x 2 0 x 1 6 cm with a   volume of 4 7 cc  The right ovary appeared normal and measured 2 1 x 2 9 x   1 3 cm with a volume of 4 1 cc  IMPRESSION      Thompson IUP   19 weeks and 1 day by this ultrasound  (HEMA=OCT 18 2017)   Breech presentation   Fetal growth appeared normal   Regular fetal heart rate of 130 bpm   Anterior placenta   No placenta previa      GENERAL COMMENT      The patient was informed of the findings and counseled about the   limitations of the exam in detecting all forms of fetal congenital   abnormalities  She denies any vaginal bleeding or uterine cramping/contractions  She does   feel fetal movement  Exam shows she is comfortable and her abdomen is non tender  Her   sequential screen was normal with a one in 10,000 risk for Diane Shirts and   trisomy 18 and a one and 6000 risk for neural tube defects  Follow up recommended:   1  She has a TVS in 2 weeks  2  After she left my office, I saw that 2 of her prior PTD had fetal   weights of 7lb 13 oz?  Recommend requesting the records of the deliveries   to confirm she reported her delivery EGA for these pregnancies correctly  I also see in her chart that she has had a chronic pain syndrome and was   on narcotics in the past  She denies any use now and is not receiving   narcotics as per the [place] drug monitoring [place] data bank  3  Growth and missed anatomy is planned for 8 weeks  JENNA Branch M D     Maternal-Fetal Medicine   Electronically signed 05/25/17 10:20

## 2018-01-18 NOTE — RESULT NOTES
Verified Results  (1) HCG QUANT [id number] 05:29PM Lakesha Boogie     Test Name Result Flag Reference   HCG QUANTITATIVE 112186 mIU/mL H <=6   Expected Ranges:     Approximate               Approximate HCG  Gestation age          Concentration ( mIU/mL)  _____________          ______________________   Abhijit Guadalupe                      HCG values  0 2-1                       5-50  1-2                           2-3                         100-5000  3-4                         500-83848  4-5                         1000-93533  5-6                         09249-129822  6-8                         97250-527810  8-12                        24588-206492     (1) PROGESTERONE [id number] 05:29PM Leatha Mendez     Test Name Result Flag Reference   PROGESTERONE 17 00 ng/mL     Patients taking DHEA-S may have falsely elevated Progesterone levels  Recommend ordering Progesterone, Lab Vadim [id number] to be done by Luis Eduardo  PROGESTERONE:     Serum progesterone 5-25 ng/mL should not be the sole determinant in excluding pregnancy  Menstruating Females:    Follicular phase 0 038-0 26 ng/mL   Luteal phase 2 25-24 2 ng/mL   Mid-luteal phase 8 76-21 6 ng/mL   Postmenopausal Females <0 200-0 901 ng/mL     Pregnant Females:   First trimester of pregnancy 11 4-41 0 ng/mL   Second trimester of pregnancy 13 8-156 ng/mL   Third trimester of pregnancy 51 4->200 ng/mL

## 2018-01-22 ENCOUNTER — APPOINTMENT (OUTPATIENT)
Dept: PHYSICAL THERAPY | Facility: REHABILITATION | Age: 33
End: 2018-01-22
Payer: COMMERCIAL

## 2018-01-22 VITALS
DIASTOLIC BLOOD PRESSURE: 64 MMHG | HEIGHT: 61 IN | WEIGHT: 179 LBS | BODY MASS INDEX: 33.79 KG/M2 | SYSTOLIC BLOOD PRESSURE: 102 MMHG

## 2018-01-22 VITALS
WEIGHT: 170 LBS | OXYGEN SATURATION: 97 % | HEART RATE: 107 BPM | BODY MASS INDEX: 32.1 KG/M2 | HEIGHT: 61 IN | TEMPERATURE: 98.4 F | DIASTOLIC BLOOD PRESSURE: 70 MMHG | SYSTOLIC BLOOD PRESSURE: 108 MMHG

## 2018-01-22 VITALS — BODY MASS INDEX: 33.63 KG/M2 | DIASTOLIC BLOOD PRESSURE: 62 MMHG | SYSTOLIC BLOOD PRESSURE: 110 MMHG | WEIGHT: 178 LBS

## 2018-01-22 VITALS — WEIGHT: 174 LBS | SYSTOLIC BLOOD PRESSURE: 108 MMHG | DIASTOLIC BLOOD PRESSURE: 64 MMHG | BODY MASS INDEX: 32.88 KG/M2

## 2018-01-22 VITALS
BODY MASS INDEX: 31.59 KG/M2 | WEIGHT: 167.19 LBS | DIASTOLIC BLOOD PRESSURE: 60 MMHG | SYSTOLIC BLOOD PRESSURE: 110 MMHG

## 2018-01-22 VITALS
WEIGHT: 155.13 LBS | BODY MASS INDEX: 29.31 KG/M2 | SYSTOLIC BLOOD PRESSURE: 112 MMHG | DIASTOLIC BLOOD PRESSURE: 70 MMHG

## 2018-01-22 VITALS — DIASTOLIC BLOOD PRESSURE: 62 MMHG | WEIGHT: 177 LBS | BODY MASS INDEX: 33.44 KG/M2 | SYSTOLIC BLOOD PRESSURE: 112 MMHG

## 2018-01-22 VITALS — BODY MASS INDEX: 32.7 KG/M2 | DIASTOLIC BLOOD PRESSURE: 60 MMHG | SYSTOLIC BLOOD PRESSURE: 100 MMHG | WEIGHT: 173.06 LBS

## 2018-01-22 VITALS
BODY MASS INDEX: 33.64 KG/M2 | WEIGHT: 178.06 LBS | SYSTOLIC BLOOD PRESSURE: 114 MMHG | DIASTOLIC BLOOD PRESSURE: 78 MMHG

## 2018-01-22 VITALS
WEIGHT: 158 LBS | HEIGHT: 61 IN | SYSTOLIC BLOOD PRESSURE: 100 MMHG | BODY MASS INDEX: 29.83 KG/M2 | DIASTOLIC BLOOD PRESSURE: 58 MMHG

## 2018-01-23 VITALS
BODY MASS INDEX: 28.05 KG/M2 | WEIGHT: 152.44 LBS | DIASTOLIC BLOOD PRESSURE: 80 MMHG | OXYGEN SATURATION: 98 % | HEART RATE: 79 BPM | HEIGHT: 62 IN | SYSTOLIC BLOOD PRESSURE: 120 MMHG | TEMPERATURE: 98 F

## 2018-03-07 NOTE — PROGRESS NOTES
Education  US Toxicology Education 1st Trimester:   First Trimester Education provided:   benefits of breastfeeding, importance of exclusive breastfeeding, early initiation of breastfeeding, exclusive breastfeeding for the first 6 months and Pregnancy Essentials Reference Guide given   The patient is planning on breastfeeding  Prenatal education provided by: [address] [place] [address]      Signatures   Electronically signed by :  AGNIESZKA Valencia; Mar 27 2017  3:21PM EST                       (Author)

## 2018-11-21 ENCOUNTER — TELEPHONE (OUTPATIENT)
Dept: FAMILY MEDICINE CLINIC | Facility: CLINIC | Age: 33
End: 2018-11-21

## 2018-11-21 NOTE — LETTER
November 28, 2018    Court Cagey  [address] [facility] [address]      Dear Ms Fuller:    Our office has made numerous attempts to contact you with no response  Please call the office at your convenience to schedule an appointment  It has been over a year since you have been seen in the office by Halie Hsu  If you have already scheduled an appointment in our office prior to receiving this letter please disregard  If you have any questions or concerns, please don't hesitate to call the office at [phone]      Sincerely,      Lonnie Chan  Group

## 2018-11-23 NOTE — TELEPHONE ENCOUNTER
Going to follow up with Manish Larios it has been over a year since she has physically bee seen in our office  I called and left a message for Zabrina on her cell phone consent ok advising that it has been over a year since we saw her in our office for an appointment  Just wanted to follow up and see if we could schedule her an appointment  Pt is to call at her convenience

## 2018-11-27 NOTE — TELEPHONE ENCOUNTER
I called and left a message for Zabrina on her cell phone consent ok advising Carol Quijano it  has been over year since  She last saw Yovany Ibrahim she is to please call the office and schedule an appointment

## 2018-11-28 NOTE — TELEPHONE ENCOUNTER
I called and left a message for Ranulfo Boogie consent ok on her cell phone advising she is to please call the office to schedule an  appointment it has been over a year since Silverio Dancer has seen her for an appointment  A letter will be mailed to patient's home

## 2020-01-07 ENCOUNTER — TELEPHONE (OUTPATIENT)
Dept: FAMILY MEDICINE CLINIC | Facility: CLINIC | Age: 35
End: 2020-01-07